# Patient Record
Sex: FEMALE | Race: BLACK OR AFRICAN AMERICAN | NOT HISPANIC OR LATINO
[De-identification: names, ages, dates, MRNs, and addresses within clinical notes are randomized per-mention and may not be internally consistent; named-entity substitution may affect disease eponyms.]

---

## 2021-03-11 PROBLEM — Z00.00 ENCOUNTER FOR PREVENTIVE HEALTH EXAMINATION: Status: ACTIVE | Noted: 2021-03-11

## 2021-03-29 NOTE — HISTORY OF PRESENT ILLNESS
[de-identified] : Ms. MOHAMUD OLIVEROS is a 75 year female who presents to office complaining of bilateral knee pain.\par \par All review of systems, family history, social history, surgical history, past medical history, medications, and allergies not previously stated as positive are negative. They were reviewed by me today with the patient and documented accordingly.

## 2021-03-31 ENCOUNTER — APPOINTMENT (OUTPATIENT)
Dept: ORTHOPEDIC SURGERY | Facility: CLINIC | Age: 76
End: 2021-03-31
Payer: MEDICARE

## 2021-03-31 ENCOUNTER — APPOINTMENT (OUTPATIENT)
Dept: ORTHOPEDIC SURGERY | Facility: CLINIC | Age: 76
End: 2021-03-31

## 2021-03-31 VITALS
HEART RATE: 69 BPM | HEIGHT: 65 IN | SYSTOLIC BLOOD PRESSURE: 202 MMHG | DIASTOLIC BLOOD PRESSURE: 80 MMHG | BODY MASS INDEX: 38.32 KG/M2 | WEIGHT: 230 LBS

## 2021-03-31 DIAGNOSIS — M25.651 STIFFNESS OF RIGHT HIP, NOT ELSEWHERE CLASSIFIED: ICD-10-CM

## 2021-03-31 PROCEDURE — 99072 ADDL SUPL MATRL&STAF TM PHE: CPT

## 2021-03-31 PROCEDURE — 73522 X-RAY EXAM HIPS BI 3-4 VIEWS: CPT

## 2021-03-31 PROCEDURE — 20610 DRAIN/INJ JOINT/BURSA W/O US: CPT | Mod: 50

## 2021-03-31 PROCEDURE — 73564 X-RAY EXAM KNEE 4 OR MORE: CPT | Mod: 50

## 2021-03-31 PROCEDURE — 99204 OFFICE O/P NEW MOD 45 MIN: CPT | Mod: 25

## 2021-03-31 RX ORDER — CELECOXIB 200 MG/1
200 CAPSULE ORAL DAILY
Qty: 30 | Refills: 1 | Status: ACTIVE | COMMUNITY
Start: 2021-03-31 | End: 1900-01-01

## 2021-03-31 NOTE — PROCEDURE
[de-identified] : \par \par Anatomic Location:  Right  Knee\par \par Diagnosis:  Arthritis\par \par Procedure:  Injection of 2cc  of Marcaine 0.25% plain and Celestone 1cc, 6mg\par \par Local Spray: Ethyl Chloride.\par \par \par Patient has consented for the procedure.\par \par Injection  through a lateral parapatella approach.\par \par Patient tolerated the procedure well.\par \par Patient instructed to call the office if any reaction, fever, chills, increased erythema or swelling.   [phone].\par \par Procedure Note:\par \par Anatomic Location:  Left Knee\par \par Diagnosis:  Arthritis\par \par Procedure:  Injection of 2cc  of Marcaine 0.25% plain and Celestone 1cc, 6mg\par \par Local Spray: Ethyl Chloride.\par \par \par Patient has consented for the procedure.\par \par Injection  through a lateral parapatella approach.\par \par Patient tolerated the procedure well.\par \par Patient instructed to call the office if any reaction, fever, chills, increased erythema or swelling.   [phone].

## 2021-03-31 NOTE — HISTORY OF PRESENT ILLNESS
[de-identified] : Ms. MOHAMUD OLIVEROS is a 75 year female who presents to office complaining of bilateral knee pain, R > L x many years.\par She has been treated by Dr. Cueva for this problem, diagnosed as osteoarthritis.\par She has received cortisone injections many times over the years, but they no longer seem to help her. Her last cortisone injection was over 3 months ago.\par She also saw a Dr. Morelos who gave her MADISON injections also in the past, but did not receive relief with this either.\par Patient has also tried and failed PT for this problem and takes Tylenol for her pain. She is hesitant to take NSAIDs due to "issues with her kidney".\par Pain is a constant dull/achy pain that becomes sharp, severe with prolonged weightbearing and ambulating.\par Pain is located in the medial and lateral aspects of the joint and does not radiate.\par All review of systems, family history, social history, surgical history, past medical history, medications, and allergies not previously stated as positive are negative. They were reviewed by me today with the patient and documented accordingly.

## 2021-03-31 NOTE — PHYSICAL EXAM
[de-identified] : Constitutional - the patient is of normal build and very overweight with a BMI of 38.  The patient remains oriented to person, time, and  place.  Mood is normal. Vital signs as recorded.  The patients gait is WNL but with pain in the medial aspect of both knees right more than left.. The patient has satisfactory  balance and can stand on toes and heels.\par \par The patient has no difficulty with respiration. Respiration at rest is a normal rate. The patient is not short of breath and has not become short of breath with short ambulation. There is no audible wheezing. No coughing.\par \par Skin is normal for the patient's age. There are no abnormal masses or lymph nodes which stand out in the lower extremities.\par \par Spine - deep tendon reflexes are symmetric. Motor and sensory are symmetric.\par \par \par UPPER EXTREMITIES \par \par Shoulders ROM  is symmetric  and the motion is satisfactory.  There is no significant shoulder pain or limitation in motion which would make using a cane or a walker difficult. Shoulder stability and  strength are satisfactory.\par \par There is normal motion in the wrists and elbows.\par \par Circulation appears satisfactory with pedal pulses present.  There is no major edema in the lower legs. No skin tenderness or increased temperature. No major varicosities.\par \par HIP EXAMINATION the abduction and abduction as well as rotation measurements were taken with the hip in flexion.\par \par Motion\par She has some mild right groin pain and slight right hip stiffness she has flexion right hip 125 left hip 135, abduction right hip 70 left hip 75, adduction right hip 25 left hip 25, external rotation right hip 65 left hip 75, internal rotation right hip 20 left hip 20.  She really feels a slight tightness in her right groin with motion yet she has not been having severe pain.\par  There is no crepitus in either hip. The alignment of the hips is normal.\par \par \par KNEE EXAMINATION\par \par Motion\par Right Knee has has pain with any ambulation and range of motion going from full extension to 115 degrees of flexion.  She has satisfactory medial lateral and anterior posterior stability there is no major effusion or Baker's cyst she has a varus alignment to her knee and pain over her medial joint line.  She has pain with weightbearing.  She has slight patellofemoral crepitus.  She has no pain over the medial joint line with motion and as well as some pain behind her patella.  \par \par            \par Left  Knee   has 0 to 120 degrees of motion with good medial lateral and anterior posterior stability.  She also has a varus alignment to this knee and pain over medial joint line.  She does have patellofemoral crepitus and some discomfort with compression of the patella but pain more with palpation of the medial joint line and a positive medial Steinmann test.  There is no Baker's cyst or no major effusion.  There this knee has pain diffusely and medially but she has more pain on the right than the left. \par \par  \par \par Ankle and foot examination\par Of the ankle has normal motion.  There is normal ankle stability.  The patient has no major abnormalities of the foot.\par \par \par \par  [de-identified] : An AP of the pelvis and a lateral of the right left hip show femoral heads are round and joint spaces are maintained.  As far as the x-rays no obvious arthritis is seen however she does complain of some groin pain and has some stiffness so will be observed in the future.\par \par 4 views were taken of both the right and left knees these show severe degenerative medial arthritis with loss of tibial plateau bone height and a slight varus each knee with degenerative changes at the periphery of the lateral compartment as well as peripheral patellofemoral degenerative changes impression.  Arthritis of all 3 compartments of the knee with most wearing degeneration of the medial compartment.  The right and left knees are approximately the same on x-ray.

## 2021-03-31 NOTE — DISCUSSION/SUMMARY
[de-identified] : At this time her hip will be watched conservatively as far as her knees she has severe degenerative arthritis and would do very well with total knee replacement in the future her right knee is worse and she would consider doing a first.  At this time she however would like to wait at least 3 months so she was given cortisone injections and given prescription for her Celebrex.\par \par A long discussion was had with the patient at total knee replacement the operative day as well as the postoperative plan as well as the cemented total knee replacement to be used.  The risk and benefits were discussed.  The natural history and treatment of degenerative arthritis was discussed with the patient at length today.  The spectrum of the treatment including nonoperative modalities to surgical intervention was elucidated.  Noninvasive and nonoperative treatment modalities include weight reduction, activity modification with low impact exercise, needed use of Tylenol or anti-inflammatory medications if tolerated, glucosamine and chondroitin supplement, and physical therapy.  In some cases the further treatment can include corticosteroid injections and the use of Visco supplementation with hyaluronic acid injections.  Definite surgical treatment can certainly include total joint arthroplasty also.  The risk and benefits of each treatment option was discussed and questions were answered.\par \par  A  long discussion was had with the patient as what the total joint replacement would entail.  A model was used as an educational tool to demonstrate the operation.    We did discuss implant choice and fixation, cemented or porous ingrowth, and stability concerns.  Shared decision making was made with the patient. The hospitalization and rehabilitation were discussed.  The uses of perioperative antibiotics and DVT prophylaxis were discussed.   The risks, benefits and alternatives to surgical intervention were discussed at length with the patient. Specific risks discussed included: infection, wound breakdown, numbness and damage to nerves, tendon, muscle, arteries or other blood vessels.  The possibility of recurrent pain, no improvement in pain and actual worsening of the pain were also mentioned in conversation with the patient. Medical complications related to the patient's general medical health including deep vein thrombosis, pulmonary embolus, heart attack, stroke, death and other complications from anesthesia were addressed. The patient was told that we will take steps to minimize these risks by using sterile technique, antibiotics and DVT prophylaxis when appropriate and following the patient postoperatively. The benefits of surgery were discussed with the patient including the potential to improve the current clinical condition throughout operative intervention. Alternatives to surgical intervention include continued conservative management which may yield less than optimal results this particular patient. Questions were answered to the satisfaction of the patient.\par \par In this individual the additional risk factors due to their medical conditions were discussed.\par \par She will return in 3 months and may want to consider scheduling total knee replacement at that time.\par \par Orthopedic decision making this patient would benefit greatly from right and left total knee replacements.\par \par The total time in this encounter with the patient, including review of the records reviewing x-rays and seeing the patient,  was over 45 minutes.

## 2021-06-01 ENCOUNTER — APPOINTMENT (OUTPATIENT)
Dept: ORTHOPEDIC SURGERY | Facility: CLINIC | Age: 76
End: 2021-06-01
Payer: MEDICARE

## 2021-06-01 VITALS
HEIGHT: 65 IN | SYSTOLIC BLOOD PRESSURE: 154 MMHG | WEIGHT: 230 LBS | BODY MASS INDEX: 38.32 KG/M2 | HEART RATE: 75 BPM | DIASTOLIC BLOOD PRESSURE: 84 MMHG

## 2021-06-01 DIAGNOSIS — M17.11 UNILATERAL PRIMARY OSTEOARTHRITIS, RIGHT KNEE: ICD-10-CM

## 2021-06-01 PROCEDURE — 99214 OFFICE O/P EST MOD 30 MIN: CPT

## 2021-06-01 NOTE — PHYSICAL EXAM
[de-identified] : \par KNEE EXAMINATION\par \par Motion\par Right Knee has has pain with any ambulation and range of motion going from 5- 115 degrees of flexion.  She has satisfactory medial lateral and anterior posterior stability there is no major effusion or Baker's cyst she has a varus alignment to her knee and pain over her medial joint line.  He has significant pain with any attempt to ambulate which has been longstanding.  She has marked patellofemoral crepitus today and pain with compression of her patella.  She has pain with palpation over medial joint line and some laterally.  \par            \par Left  Knee   has 0 to 120 degrees of motion with good medial lateral and anterior posterior stability.  She also has a varus alignment to this knee and pain over medial joint line.  There she does have patellofemoral crepitus and pain with compression of the patella but not as severe she has also pain with palpation over her medial joint line and does have a medial positive Steinmann test.  There is no effusion and no Baker's cyst.  \par \par

## 2021-06-01 NOTE — HISTORY OF PRESENT ILLNESS
[de-identified] : Ms. MOHAMUD OLIVEROS is a 75 year female who presents to office complaining of bilateral knee pain, R > L x many years.\par She has been treated by Dr. Cueva for this problem, diagnosed as osteoarthritis.\par She has received cortisone injections many times over the years, but they no longer seem to help her. Her last cortisone injection was 2 months ago and didn't help.\par She also saw a Dr. oMrelos who gave her MADISON injections also in the past, but did not receive relief with this either.\par Patient has also tried and failed PT for this problem and takes Tylenol for her pain. She is hesitant to take NSAIDs due to "issues with her kidney".\par Pain is a constant dull/achy pain that becomes sharp, severe with prolonged weightbearing and ambulating.\par Pain is located in the medial and lateral aspects of the joint and does not radiate.\par She is here today to discuss TKR surgery, R > L.

## 2021-06-01 NOTE — DISCUSSION/SUMMARY
[de-identified] : Patient has severe osteoarthritis of both of her knees.  Her right knee however is very severe and she is having great difficulty with her daily activities.  She has tried all conservative methods of treatment including different oral medications and injections however she now realizes that she would like to schedule a right total knee replacement.  A discussion of her knee and the surgical risk and benefits were had at her last visit however they are repeated and she was told again the risk and benefits.  The natural history and treatment of degenerative arthritis was discussed with the patient at length today.  The spectrum of the treatment including nonoperative modalities to surgical intervention was elucidated.  Noninvasive and nonoperative treatment modalities include weight reduction, activity modification with low impact exercise, needed use of Tylenol or anti-inflammatory medications if tolerated, glucosamine and chondroitin supplement, and physical therapy.  In some cases the further treatment can include corticosteroid injections and the use of Visco supplementation with hyaluronic acid injections.  Definite surgical treatment can certainly include total joint arthroplasty also.  The risk and benefits of each treatment option was discussed and questions were answered.\par \par  A  long discussion was had with the patient as what the total joint replacement would entail.  A model was used as an educational tool to demonstrate the operation.    We did discuss implant choice and fixation, cemented or porous ingrowth, and stability concerns.  Shared decision making was made with the patient. The hospitalization and rehabilitation were discussed.  The uses of perioperative antibiotics and DVT prophylaxis were discussed.   The risks, benefits and alternatives to surgical intervention were discussed at length with the patient. Specific risks discussed included: infection, wound breakdown, numbness and damage to nerves, tendon, muscle, arteries or other blood vessels.  The possibility of recurrent pain, no improvement in pain and actual worsening of the pain were also mentioned in conversation with the patient. Medical complications related to the patient's general medical health including deep vein thrombosis, pulmonary embolus, heart attack, stroke, death and other complications from anesthesia were addressed. The patient was told that we will take steps to minimize these risks by using sterile technique, antibiotics and DVT prophylaxis when appropriate and following the patient postoperatively. The benefits of surgery were discussed with the patient including the potential to improve the current clinical condition throughout operative intervention. Alternatives to surgical intervention include continued conservative management which may yield less than optimal results this particular patient. Questions were answered to the satisfaction of the patient.\par \par In this individual the additional risk factors due to their medical conditions were discussed.\par \par There was also a discussion today of the possible  use of the Koby robot and the total knee replacement.  Either a porous or cemented prosthesis.  She is in agreement with the plan.\par \par Orthopedic decision making #1 proceed with right total knee replacement.  Possibly do a left total knee replacement anytime after 3 months later.

## 2021-07-27 VITALS — HEIGHT: 65 IN | BODY MASS INDEX: 40.32 KG/M2 | WEIGHT: 242 LBS

## 2021-08-06 ENCOUNTER — APPOINTMENT (OUTPATIENT)
Dept: ORTHOPEDIC SURGERY | Facility: HOSPITAL | Age: 76
End: 2021-08-06

## 2021-08-27 ENCOUNTER — OUTPATIENT (OUTPATIENT)
Dept: OUTPATIENT SERVICES | Facility: HOSPITAL | Age: 76
LOS: 1 days | End: 2021-08-27
Payer: MEDICARE

## 2021-08-27 ENCOUNTER — APPOINTMENT (OUTPATIENT)
Dept: CT IMAGING | Facility: CLINIC | Age: 76
End: 2021-08-27
Payer: MEDICARE

## 2021-08-27 VITALS
TEMPERATURE: 98 F | SYSTOLIC BLOOD PRESSURE: 176 MMHG | HEART RATE: 66 BPM | WEIGHT: 235.01 LBS | RESPIRATION RATE: 18 BRPM | DIASTOLIC BLOOD PRESSURE: 75 MMHG | HEIGHT: 63 IN | OXYGEN SATURATION: 97 %

## 2021-08-27 DIAGNOSIS — M19.90 UNSPECIFIED OSTEOARTHRITIS, UNSPECIFIED SITE: ICD-10-CM

## 2021-08-27 DIAGNOSIS — Z01.818 ENCOUNTER FOR OTHER PREPROCEDURAL EXAMINATION: ICD-10-CM

## 2021-08-27 DIAGNOSIS — S68.119A COMPLETE TRAUMATIC METACARPOPHALANGEAL AMPUTATION OF UNSPECIFIED FINGER, INITIAL ENCOUNTER: Chronic | ICD-10-CM

## 2021-08-27 DIAGNOSIS — M17.11 UNILATERAL PRIMARY OSTEOARTHRITIS, RIGHT KNEE: ICD-10-CM

## 2021-08-27 DIAGNOSIS — Z98.890 OTHER SPECIFIED POSTPROCEDURAL STATES: Chronic | ICD-10-CM

## 2021-08-27 DIAGNOSIS — E11.9 TYPE 2 DIABETES MELLITUS WITHOUT COMPLICATIONS: ICD-10-CM

## 2021-08-27 LAB
A1C WITH ESTIMATED AVERAGE GLUCOSE RESULT: 6.3 % — HIGH (ref 4–5.6)
BLD GP AB SCN SERPL QL: NEGATIVE — SIGNIFICANT CHANGE UP
ESTIMATED AVERAGE GLUCOSE: 134 MG/DL — HIGH (ref 68–114)
MRSA PCR RESULT.: SIGNIFICANT CHANGE UP
RH IG SCN BLD-IMP: NEGATIVE — SIGNIFICANT CHANGE UP
S AUREUS DNA NOSE QL NAA+PROBE: SIGNIFICANT CHANGE UP

## 2021-08-27 PROCEDURE — G0463: CPT

## 2021-08-27 PROCEDURE — 83036 HEMOGLOBIN GLYCOSYLATED A1C: CPT

## 2021-08-27 PROCEDURE — 86901 BLOOD TYPING SEROLOGIC RH(D): CPT

## 2021-08-27 PROCEDURE — 86900 BLOOD TYPING SEROLOGIC ABO: CPT

## 2021-08-27 PROCEDURE — 87640 STAPH A DNA AMP PROBE: CPT

## 2021-08-27 PROCEDURE — 73700 CT LOWER EXTREMITY W/O DYE: CPT | Mod: 26,RT

## 2021-08-27 PROCEDURE — 86850 RBC ANTIBODY SCREEN: CPT

## 2021-08-27 PROCEDURE — 73700 CT LOWER EXTREMITY W/O DYE: CPT

## 2021-08-27 PROCEDURE — 87641 MR-STAPH DNA AMP PROBE: CPT

## 2021-08-27 RX ORDER — SODIUM CHLORIDE 9 MG/ML
3 INJECTION INTRAMUSCULAR; INTRAVENOUS; SUBCUTANEOUS EVERY 8 HOURS
Refills: 0 | Status: DISCONTINUED | OUTPATIENT
Start: 2021-09-10 | End: 2021-09-10

## 2021-08-27 RX ORDER — CEFAZOLIN SODIUM 1 G
2000 VIAL (EA) INJECTION ONCE
Refills: 0 | Status: DISCONTINUED | OUTPATIENT
Start: 2021-09-10 | End: 2021-09-13

## 2021-08-27 RX ORDER — TRAMADOL HYDROCHLORIDE 50 MG/1
50 TABLET ORAL ONCE
Refills: 0 | Status: DISCONTINUED | OUTPATIENT
Start: 2021-09-10 | End: 2021-09-10

## 2021-08-27 RX ORDER — LIDOCAINE HCL 20 MG/ML
0.2 VIAL (ML) INJECTION ONCE
Refills: 0 | Status: DISCONTINUED | OUTPATIENT
Start: 2021-09-10 | End: 2021-09-10

## 2021-08-27 RX ORDER — PANTOPRAZOLE SODIUM 20 MG/1
40 TABLET, DELAYED RELEASE ORAL ONCE
Refills: 0 | Status: COMPLETED | OUTPATIENT
Start: 2021-09-10 | End: 2021-09-10

## 2021-08-27 NOTE — H&P PST ADULT - HISTORY OF PRESENT ILLNESS
This is a 75 y/o female PMH diabetes, hypertension, RAMOS, non-compliant with CPAP, osteoarthritis, c/o right knee pain for many years, S/P PT and steroid injections without relief.  Presents today for right total knee replacement with KERI.

## 2021-08-27 NOTE — H&P PST ADULT - NSICDXPASTMEDICALHX_GEN_ALL_CORE_FT
PAST MEDICAL HISTORY:  Asthma     Diabetes     Hypertension     Osteoarthritis      PAST MEDICAL HISTORY:  Asthma     Diabetes     Hypertension     Lumbar herniated disc     Osteoarthritis

## 2021-09-01 ENCOUNTER — NON-APPOINTMENT (OUTPATIENT)
Age: 76
End: 2021-09-01

## 2021-09-07 PROBLEM — J45.909 UNSPECIFIED ASTHMA, UNCOMPLICATED: Chronic | Status: ACTIVE | Noted: 2021-08-27

## 2021-09-07 PROBLEM — M19.90 UNSPECIFIED OSTEOARTHRITIS, UNSPECIFIED SITE: Chronic | Status: ACTIVE | Noted: 2021-08-27

## 2021-09-07 PROBLEM — I10 ESSENTIAL (PRIMARY) HYPERTENSION: Chronic | Status: ACTIVE | Noted: 2021-08-27

## 2021-09-07 PROBLEM — M51.26 OTHER INTERVERTEBRAL DISC DISPLACEMENT, LUMBAR REGION: Chronic | Status: ACTIVE | Noted: 2021-08-27

## 2021-09-08 ENCOUNTER — OUTPATIENT (OUTPATIENT)
Dept: OUTPATIENT SERVICES | Facility: HOSPITAL | Age: 76
LOS: 1 days | End: 2021-09-08
Payer: MEDICARE

## 2021-09-08 DIAGNOSIS — Z98.890 OTHER SPECIFIED POSTPROCEDURAL STATES: Chronic | ICD-10-CM

## 2021-09-08 DIAGNOSIS — S68.119A COMPLETE TRAUMATIC METACARPOPHALANGEAL AMPUTATION OF UNSPECIFIED FINGER, INITIAL ENCOUNTER: Chronic | ICD-10-CM

## 2021-09-08 DIAGNOSIS — Z11.52 ENCOUNTER FOR SCREENING FOR COVID-19: ICD-10-CM

## 2021-09-08 LAB — SARS-COV-2 RNA SPEC QL NAA+PROBE: SIGNIFICANT CHANGE UP

## 2021-09-08 PROCEDURE — U0005: CPT

## 2021-09-08 PROCEDURE — C9803: CPT

## 2021-09-08 PROCEDURE — U0003: CPT

## 2021-09-09 ENCOUNTER — TRANSCRIPTION ENCOUNTER (OUTPATIENT)
Age: 76
End: 2021-09-09

## 2021-09-10 ENCOUNTER — APPOINTMENT (OUTPATIENT)
Dept: ORTHOPEDIC SURGERY | Facility: HOSPITAL | Age: 76
End: 2021-09-10

## 2021-09-10 ENCOUNTER — INPATIENT (INPATIENT)
Facility: HOSPITAL | Age: 76
LOS: 2 days | Discharge: HOME HEALTH | DRG: 470 | End: 2021-09-13
Attending: ORTHOPAEDIC SURGERY | Admitting: ORTHOPAEDIC SURGERY
Payer: MEDICARE

## 2021-09-10 VITALS
SYSTOLIC BLOOD PRESSURE: 166 MMHG | RESPIRATION RATE: 18 BRPM | TEMPERATURE: 99 F | OXYGEN SATURATION: 97 % | DIASTOLIC BLOOD PRESSURE: 76 MMHG | HEART RATE: 58 BPM | WEIGHT: 235.01 LBS | HEIGHT: 63 IN

## 2021-09-10 DIAGNOSIS — Z98.890 OTHER SPECIFIED POSTPROCEDURAL STATES: Chronic | ICD-10-CM

## 2021-09-10 DIAGNOSIS — M17.11 UNILATERAL PRIMARY OSTEOARTHRITIS, RIGHT KNEE: ICD-10-CM

## 2021-09-10 DIAGNOSIS — S68.119A COMPLETE TRAUMATIC METACARPOPHALANGEAL AMPUTATION OF UNSPECIFIED FINGER, INITIAL ENCOUNTER: Chronic | ICD-10-CM

## 2021-09-10 LAB
GLUCOSE BLDC GLUCOMTR-MCNC: 119 MG/DL — HIGH (ref 70–99)
GLUCOSE BLDC GLUCOMTR-MCNC: 120 MG/DL — HIGH (ref 70–99)
GLUCOSE BLDC GLUCOMTR-MCNC: 182 MG/DL — HIGH (ref 70–99)
RH IG SCN BLD-IMP: NEGATIVE — SIGNIFICANT CHANGE UP

## 2021-09-10 PROCEDURE — 27447 TOTAL KNEE ARTHROPLASTY: CPT | Mod: RT

## 2021-09-10 PROCEDURE — 73560 X-RAY EXAM OF KNEE 1 OR 2: CPT | Mod: 26,RT

## 2021-09-10 RX ORDER — SODIUM CHLORIDE 9 MG/ML
500 INJECTION, SOLUTION INTRAVENOUS ONCE
Refills: 0 | Status: COMPLETED | OUTPATIENT
Start: 2021-09-10 | End: 2021-09-11

## 2021-09-10 RX ORDER — ACETAMINOPHEN 500 MG
1000 TABLET ORAL ONCE
Refills: 0 | Status: DISCONTINUED | OUTPATIENT
Start: 2021-09-10 | End: 2021-09-13

## 2021-09-10 RX ORDER — DEXAMETHASONE 0.5 MG/5ML
8 ELIXIR ORAL ONCE
Refills: 0 | Status: COMPLETED | OUTPATIENT
Start: 2021-09-11 | End: 2021-09-11

## 2021-09-10 RX ORDER — SODIUM CHLORIDE 9 MG/ML
1000 INJECTION, SOLUTION INTRAVENOUS
Refills: 0 | Status: DISCONTINUED | OUTPATIENT
Start: 2021-09-10 | End: 2021-09-13

## 2021-09-10 RX ORDER — GLUCAGON INJECTION, SOLUTION 0.5 MG/.1ML
1 INJECTION, SOLUTION SUBCUTANEOUS ONCE
Refills: 0 | Status: DISCONTINUED | OUTPATIENT
Start: 2021-09-10 | End: 2021-09-13

## 2021-09-10 RX ORDER — ONDANSETRON 8 MG/1
4 TABLET, FILM COATED ORAL ONCE
Refills: 0 | Status: DISCONTINUED | OUTPATIENT
Start: 2021-09-10 | End: 2021-09-10

## 2021-09-10 RX ORDER — INSULIN LISPRO 100/ML
VIAL (ML) SUBCUTANEOUS
Refills: 0 | Status: DISCONTINUED | OUTPATIENT
Start: 2021-09-10 | End: 2021-09-13

## 2021-09-10 RX ORDER — TRAMADOL HYDROCHLORIDE 50 MG/1
50 TABLET ORAL EVERY 6 HOURS
Refills: 0 | Status: DISCONTINUED | OUTPATIENT
Start: 2021-09-10 | End: 2021-09-13

## 2021-09-10 RX ORDER — SODIUM CHLORIDE 9 MG/ML
500 INJECTION, SOLUTION INTRAVENOUS ONCE
Refills: 0 | Status: COMPLETED | OUTPATIENT
Start: 2021-09-10 | End: 2021-09-10

## 2021-09-10 RX ORDER — DEXTROSE 50 % IN WATER 50 %
25 SYRINGE (ML) INTRAVENOUS ONCE
Refills: 0 | Status: DISCONTINUED | OUTPATIENT
Start: 2021-09-10 | End: 2021-09-13

## 2021-09-10 RX ORDER — GABAPENTIN 400 MG/1
300 CAPSULE ORAL
Refills: 0 | Status: DISCONTINUED | OUTPATIENT
Start: 2021-09-10 | End: 2021-09-13

## 2021-09-10 RX ORDER — CEFAZOLIN SODIUM 1 G
2000 VIAL (EA) INJECTION EVERY 8 HOURS
Refills: 0 | Status: COMPLETED | OUTPATIENT
Start: 2021-09-10 | End: 2021-09-11

## 2021-09-10 RX ORDER — ONDANSETRON 8 MG/1
4 TABLET, FILM COATED ORAL EVERY 6 HOURS
Refills: 0 | Status: DISCONTINUED | OUTPATIENT
Start: 2021-09-10 | End: 2021-09-13

## 2021-09-10 RX ORDER — DEXTROSE 50 % IN WATER 50 %
15 SYRINGE (ML) INTRAVENOUS ONCE
Refills: 0 | Status: DISCONTINUED | OUTPATIENT
Start: 2021-09-10 | End: 2021-09-13

## 2021-09-10 RX ORDER — MAGNESIUM HYDROXIDE 400 MG/1
30 TABLET, CHEWABLE ORAL DAILY
Refills: 0 | Status: DISCONTINUED | OUTPATIENT
Start: 2021-09-10 | End: 2021-09-13

## 2021-09-10 RX ORDER — KETOROLAC TROMETHAMINE 30 MG/ML
15 SYRINGE (ML) INJECTION EVERY 6 HOURS
Refills: 0 | Status: DISCONTINUED | OUTPATIENT
Start: 2021-09-10 | End: 2021-09-11

## 2021-09-10 RX ORDER — FENTANYL CITRATE 50 UG/ML
25 INJECTION INTRAVENOUS
Refills: 0 | Status: DISCONTINUED | OUTPATIENT
Start: 2021-09-10 | End: 2021-09-10

## 2021-09-10 RX ORDER — ASPIRIN/CALCIUM CARB/MAGNESIUM 324 MG
325 TABLET ORAL
Refills: 0 | Status: DISCONTINUED | OUTPATIENT
Start: 2021-09-10 | End: 2021-09-13

## 2021-09-10 RX ORDER — INSULIN LISPRO 100/ML
VIAL (ML) SUBCUTANEOUS AT BEDTIME
Refills: 0 | Status: DISCONTINUED | OUTPATIENT
Start: 2021-09-10 | End: 2021-09-13

## 2021-09-10 RX ORDER — ACETAMINOPHEN 500 MG
975 TABLET ORAL EVERY 8 HOURS
Refills: 0 | Status: DISCONTINUED | OUTPATIENT
Start: 2021-09-10 | End: 2021-09-13

## 2021-09-10 RX ORDER — ATORVASTATIN CALCIUM 80 MG/1
20 TABLET, FILM COATED ORAL AT BEDTIME
Refills: 0 | Status: DISCONTINUED | OUTPATIENT
Start: 2021-09-10 | End: 2021-09-13

## 2021-09-10 RX ORDER — ALBUTEROL 90 UG/1
2 AEROSOL, METERED ORAL EVERY 6 HOURS
Refills: 0 | Status: DISCONTINUED | OUTPATIENT
Start: 2021-09-10 | End: 2021-09-13

## 2021-09-10 RX ORDER — METOPROLOL TARTRATE 50 MG
200 TABLET ORAL DAILY
Refills: 0 | Status: DISCONTINUED | OUTPATIENT
Start: 2021-09-10 | End: 2021-09-13

## 2021-09-10 RX ORDER — CHLORHEXIDINE GLUCONATE 213 G/1000ML
1 SOLUTION TOPICAL ONCE
Refills: 0 | Status: COMPLETED | OUTPATIENT
Start: 2021-09-10 | End: 2021-09-10

## 2021-09-10 RX ORDER — METHOCARBAMOL 500 MG/1
750 TABLET, FILM COATED ORAL
Refills: 0 | Status: DISCONTINUED | OUTPATIENT
Start: 2021-09-10 | End: 2021-09-13

## 2021-09-10 RX ORDER — PANTOPRAZOLE SODIUM 20 MG/1
40 TABLET, DELAYED RELEASE ORAL
Refills: 0 | Status: DISCONTINUED | OUTPATIENT
Start: 2021-09-10 | End: 2021-09-13

## 2021-09-10 RX ORDER — OXYCODONE HYDROCHLORIDE 5 MG/1
10 TABLET ORAL
Refills: 0 | Status: DISCONTINUED | OUTPATIENT
Start: 2021-09-10 | End: 2021-09-13

## 2021-09-10 RX ORDER — FUROSEMIDE 40 MG
40 TABLET ORAL DAILY
Refills: 0 | Status: DISCONTINUED | OUTPATIENT
Start: 2021-09-10 | End: 2021-09-13

## 2021-09-10 RX ORDER — HYDROMORPHONE HYDROCHLORIDE 2 MG/ML
0.5 INJECTION INTRAMUSCULAR; INTRAVENOUS; SUBCUTANEOUS ONCE
Refills: 0 | Status: DISCONTINUED | OUTPATIENT
Start: 2021-09-10 | End: 2021-09-13

## 2021-09-10 RX ORDER — DEXTROSE 50 % IN WATER 50 %
12.5 SYRINGE (ML) INTRAVENOUS ONCE
Refills: 0 | Status: DISCONTINUED | OUTPATIENT
Start: 2021-09-10 | End: 2021-09-13

## 2021-09-10 RX ORDER — OXYCODONE HYDROCHLORIDE 5 MG/1
5 TABLET ORAL
Refills: 0 | Status: DISCONTINUED | OUTPATIENT
Start: 2021-09-10 | End: 2021-09-13

## 2021-09-10 RX ORDER — BUDESONIDE AND FORMOTEROL FUMARATE DIHYDRATE 160; 4.5 UG/1; UG/1
2 AEROSOL RESPIRATORY (INHALATION) DAILY
Refills: 0 | Status: DISCONTINUED | OUTPATIENT
Start: 2021-09-10 | End: 2021-09-13

## 2021-09-10 RX ADMIN — Medication 975 MILLIGRAM(S): at 21:28

## 2021-09-10 RX ADMIN — SODIUM CHLORIDE 100 MILLILITER(S): 9 INJECTION, SOLUTION INTRAVENOUS at 18:26

## 2021-09-10 RX ADMIN — METHOCARBAMOL 750 MILLIGRAM(S): 500 TABLET, FILM COATED ORAL at 20:03

## 2021-09-10 RX ADMIN — Medication 15 MILLIGRAM(S): at 21:30

## 2021-09-10 RX ADMIN — SODIUM CHLORIDE 500 MILLILITER(S): 9 INJECTION, SOLUTION INTRAVENOUS at 18:26

## 2021-09-10 RX ADMIN — TRAMADOL HYDROCHLORIDE 50 MILLIGRAM(S): 50 TABLET ORAL at 13:47

## 2021-09-10 RX ADMIN — GABAPENTIN 300 MILLIGRAM(S): 400 CAPSULE ORAL at 18:25

## 2021-09-10 RX ADMIN — SODIUM CHLORIDE 500 MILLILITER(S): 9 INJECTION, SOLUTION INTRAVENOUS at 21:23

## 2021-09-10 RX ADMIN — Medication 100 MILLIGRAM(S): at 21:24

## 2021-09-10 RX ADMIN — Medication 325 MILLIGRAM(S): at 18:25

## 2021-09-10 RX ADMIN — Medication 150 MILLIGRAM(S): at 13:47

## 2021-09-10 RX ADMIN — OXYCODONE HYDROCHLORIDE 5 MILLIGRAM(S): 5 TABLET ORAL at 22:05

## 2021-09-10 RX ADMIN — OXYCODONE HYDROCHLORIDE 5 MILLIGRAM(S): 5 TABLET ORAL at 21:35

## 2021-09-10 RX ADMIN — CHLORHEXIDINE GLUCONATE 1 APPLICATION(S): 213 SOLUTION TOPICAL at 13:25

## 2021-09-10 RX ADMIN — SODIUM CHLORIDE 100 MILLILITER(S): 9 INJECTION, SOLUTION INTRAVENOUS at 23:02

## 2021-09-10 RX ADMIN — PANTOPRAZOLE SODIUM 40 MILLIGRAM(S): 20 TABLET, DELAYED RELEASE ORAL at 13:47

## 2021-09-10 NOTE — CHART NOTE - NSCHARTNOTEFT_GEN_A_CORE
Patient seen in PACU resting without complaints.  No Chest Pain, SOB, N/V.    T(C): 36.2 (09-10-21 @ 17:10), Max: 37.2 (09-10-21 @ 11:55)  HR: 53 (09-10-21 @ 18:30) (49 - 59)  BP: 125/61 (09-10-21 @ 18:30) (93/55 - 166/76)  RR: 15 (09-10-21 @ 18:30) (15 - 18)  SpO2: 98% (09-10-21 @ 18:30) (95% - 98%)  Wt(kg): --    Exam:  Alert and oriented, no acute distress  Laterality: RLE knee aquacel in place, C/D/I  Calves soft, non-tender bilaterally  +PF/DF/EHL/FHL  SILT  + DP pulse    Xray: in chart             A/P: 76yFemale S/p  R Total Knee Arthroplasty. VSS. NAD  -PT/OT-WBAT RLE, OOB when tolerated  -IS encouraged  -DVT PPx with  mg BID  -Followup AM labs  -Pain Control  -PACU to floor    Umu Dubois PA-C  Team Pager #[phone]

## 2021-09-10 NOTE — PHYSICAL THERAPY INITIAL EVALUATION ADULT - PERTINENT HX OF CURRENT PROBLEM, REHAB EVAL
75 y/o F with h/o diabetes, HTN, RAMOS, non-compliant with CPAP, OA, with c/o R knee pain for many years. pt now presents s/p R TKA with KERI Robot.

## 2021-09-10 NOTE — PATIENT PROFILE ADULT - NSPROGENPREVTRANSF_GEN_A_NUR
no history of blood product transfusion Peng Advancement Flap Text: The defect edges were debeveled with a #15 scalpel blade.  Given the location of the defect, shape of the defect and the proximity to free margins a Peng advancement flap was deemed most appropriate.  Using a sterile surgical marker, an appropriate advancement flap was drawn incorporating the defect and placing the expected incisions within the relaxed skin tension lines where possible. The area thus outlined was incised deep to adipose tissue with a #15 scalpel blade.  The skin margins were undermined to an appropriate distance in all directions utilizing iris scissors.

## 2021-09-10 NOTE — PRE-ANESTHESIA EVALUATION ADULT - NSANTHPMHFT_GEN_ALL_CORE
75 y/o female PMH diabetes, hypertension, RAMOS, non-compliant with CPAP, osteoarthritis, c/o right knee pain for many years, S/P PT and steroid injections without relief.  Presents today for right total knee replacement with KERI. 77 y/o female PMH diabetes, hypertension, RAMOS, non-compliant with CPAP, osteoarthritis, c/o right knee pain for many years, S/P PT and steroid injections without relief.  Also has LVEF 40% and mild pulmonary HTN on outpatient TTE

## 2021-09-10 NOTE — PHYSICAL THERAPY INITIAL EVALUATION ADULT - ADDITIONAL COMMENTS
pt lives in elevated apt with dtr. Prior to admission, pt was I with all functional mobility and ADLs without AD. Pt owns a cane, but admits to not using it.

## 2021-09-11 LAB
ANION GAP SERPL CALC-SCNC: 15 MMOL/L — SIGNIFICANT CHANGE UP (ref 5–17)
BUN SERPL-MCNC: 25 MG/DL — HIGH (ref 7–23)
CALCIUM SERPL-MCNC: 9.9 MG/DL — SIGNIFICANT CHANGE UP (ref 8.4–10.5)
CHLORIDE SERPL-SCNC: 101 MMOL/L — SIGNIFICANT CHANGE UP (ref 96–108)
CO2 SERPL-SCNC: 21 MMOL/L — LOW (ref 22–31)
COVID-19 SPIKE DOMAIN AB INTERP: POSITIVE
COVID-19 SPIKE DOMAIN ANTIBODY RESULT: >250 U/ML — HIGH
CREAT SERPL-MCNC: 1.16 MG/DL — SIGNIFICANT CHANGE UP (ref 0.5–1.3)
GLUCOSE BLDC GLUCOMTR-MCNC: 144 MG/DL — HIGH (ref 70–99)
GLUCOSE BLDC GLUCOMTR-MCNC: 151 MG/DL — HIGH (ref 70–99)
GLUCOSE BLDC GLUCOMTR-MCNC: 191 MG/DL — HIGH (ref 70–99)
GLUCOSE BLDC GLUCOMTR-MCNC: 200 MG/DL — HIGH (ref 70–99)
GLUCOSE SERPL-MCNC: 157 MG/DL — HIGH (ref 70–99)
HCT VFR BLD CALC: 34.3 % — LOW (ref 34.5–45)
HGB BLD-MCNC: 10.8 G/DL — LOW (ref 11.5–15.5)
MCHC RBC-ENTMCNC: 27.9 PG — SIGNIFICANT CHANGE UP (ref 27–34)
MCHC RBC-ENTMCNC: 31.5 GM/DL — LOW (ref 32–36)
MCV RBC AUTO: 88.6 FL — SIGNIFICANT CHANGE UP (ref 80–100)
NRBC # BLD: 0 /100 WBCS — SIGNIFICANT CHANGE UP (ref 0–0)
PLATELET # BLD AUTO: 225 K/UL — SIGNIFICANT CHANGE UP (ref 150–400)
POTASSIUM SERPL-MCNC: 4.3 MMOL/L — SIGNIFICANT CHANGE UP (ref 3.5–5.3)
POTASSIUM SERPL-SCNC: 4.3 MMOL/L — SIGNIFICANT CHANGE UP (ref 3.5–5.3)
RBC # BLD: 3.87 M/UL — SIGNIFICANT CHANGE UP (ref 3.8–5.2)
RBC # FLD: 13.2 % — SIGNIFICANT CHANGE UP (ref 10.3–14.5)
SARS-COV-2 IGG+IGM SERPL QL IA: >250 U/ML — HIGH
SARS-COV-2 IGG+IGM SERPL QL IA: POSITIVE
SODIUM SERPL-SCNC: 137 MMOL/L — SIGNIFICANT CHANGE UP (ref 135–145)
WBC # BLD: 12.14 K/UL — HIGH (ref 3.8–10.5)
WBC # FLD AUTO: 12.14 K/UL — HIGH (ref 3.8–10.5)

## 2021-09-11 RX ADMIN — METHOCARBAMOL 750 MILLIGRAM(S): 500 TABLET, FILM COATED ORAL at 10:00

## 2021-09-11 RX ADMIN — OXYCODONE HYDROCHLORIDE 10 MILLIGRAM(S): 5 TABLET ORAL at 09:23

## 2021-09-11 RX ADMIN — GABAPENTIN 300 MILLIGRAM(S): 400 CAPSULE ORAL at 05:24

## 2021-09-11 RX ADMIN — Medication 975 MILLIGRAM(S): at 05:26

## 2021-09-11 RX ADMIN — Medication 325 MILLIGRAM(S): at 05:24

## 2021-09-11 RX ADMIN — GABAPENTIN 300 MILLIGRAM(S): 400 CAPSULE ORAL at 18:09

## 2021-09-11 RX ADMIN — METHOCARBAMOL 750 MILLIGRAM(S): 500 TABLET, FILM COATED ORAL at 22:03

## 2021-09-11 RX ADMIN — Medication 975 MILLIGRAM(S): at 22:33

## 2021-09-11 RX ADMIN — PANTOPRAZOLE SODIUM 40 MILLIGRAM(S): 20 TABLET, DELAYED RELEASE ORAL at 05:24

## 2021-09-11 RX ADMIN — Medication 100 MILLIGRAM(S): at 05:24

## 2021-09-11 RX ADMIN — Medication 200 MILLIGRAM(S): at 06:47

## 2021-09-11 RX ADMIN — Medication 1 TABLET(S): at 12:33

## 2021-09-11 RX ADMIN — Medication 15 MILLIGRAM(S): at 12:47

## 2021-09-11 RX ADMIN — SODIUM CHLORIDE 500 MILLILITER(S): 9 INJECTION, SOLUTION INTRAVENOUS at 05:23

## 2021-09-11 RX ADMIN — OXYCODONE HYDROCHLORIDE 10 MILLIGRAM(S): 5 TABLET ORAL at 08:53

## 2021-09-11 RX ADMIN — Medication 15 MILLIGRAM(S): at 12:32

## 2021-09-11 RX ADMIN — ATORVASTATIN CALCIUM 20 MILLIGRAM(S): 80 TABLET, FILM COATED ORAL at 22:03

## 2021-09-11 RX ADMIN — Medication 325 MILLIGRAM(S): at 18:10

## 2021-09-11 RX ADMIN — Medication 40 MILLIGRAM(S): at 05:24

## 2021-09-11 RX ADMIN — Medication 15 MILLIGRAM(S): at 18:09

## 2021-09-11 RX ADMIN — Medication 15 MILLIGRAM(S): at 05:24

## 2021-09-11 RX ADMIN — Medication 2: at 18:09

## 2021-09-11 RX ADMIN — Medication 2: at 12:32

## 2021-09-11 RX ADMIN — Medication 975 MILLIGRAM(S): at 22:03

## 2021-09-11 RX ADMIN — BUDESONIDE AND FORMOTEROL FUMARATE DIHYDRATE 2 PUFF(S): 160; 4.5 AEROSOL RESPIRATORY (INHALATION) at 12:33

## 2021-09-11 RX ADMIN — Medication 8 MILLIGRAM(S): at 06:46

## 2021-09-11 NOTE — PROVIDER CONTACT NOTE (OTHER) - ACTION/TREATMENT ORDERED:
MD made aware. As per MD Corley, bladder scan Pt in 2 hours and notify MD at that time. Will continue to monitor Pt.
MD made aware. As per MD Corley, bladder scan Pt in 2 hours and notify MD at that time. Will continue to monitor Pt.

## 2021-09-11 NOTE — PROGRESS NOTE ADULT - NSPROGADDITIONALINFOA_GEN_ALL_CORE
Patient is a 76y old  Female    right knee replacement        Patient comfortable:    No complaints    I agree with Physician's Assistant current note.      Alert, NAD    Knee: Moderate drainage in dressing. Will =change today.     Plan for physical therapy to get out of bed, ambulate full weight bearing as tolerated. work on knee ROM        Plan for Disposition:  This patient lives alone. She will work on a plan with the [facility] planning office.     Quentin Dutton MD  [facility]  [phone]

## 2021-09-11 NOTE — PROVIDER CONTACT NOTE (OTHER) - ASSESSMENT
Pt remains A&Ox4, VSS. Pt DTV @03:00. Pt denies discomfort.  Denies pain upon palpation, bladder distended. Pt reports the urge to urinate.
Pt remains A&Ox4, VSS. Pt DTV @01:00. Pt denies discomfort.  Denies urge to urinate at this time.

## 2021-09-11 NOTE — OCCUPATIONAL THERAPY INITIAL EVALUATION ADULT - PERSONAL SAFETY AND JUDGMENT, REHAB EVAL
mildly impulsive; attempting to stand without assistance & without ability to fully actively extend knee/impaired

## 2021-09-11 NOTE — OCCUPATIONAL THERAPY INITIAL EVALUATION ADULT - LIVES WITH, PROFILE
Pt lives in an apartment with her daughter who works full time, +no steps to enter, +elevator to apartment, +shower tub with chair.

## 2021-09-11 NOTE — OCCUPATIONAL THERAPY INITIAL EVALUATION ADULT - PERTINENT HX OF CURRENT PROBLEM, REHAB EVAL
75 y/o female PMH diabetes, hypertension, RAMOS, non-compliant with CPAP, osteoarthritis, c/o right knee pain for many years, S/P PT and steroid injections without relief. Now s/p R TKA 9/10/21.

## 2021-09-12 LAB
GLUCOSE BLDC GLUCOMTR-MCNC: 117 MG/DL — HIGH (ref 70–99)
GLUCOSE BLDC GLUCOMTR-MCNC: 127 MG/DL — HIGH (ref 70–99)
GLUCOSE BLDC GLUCOMTR-MCNC: 137 MG/DL — HIGH (ref 70–99)
GLUCOSE BLDC GLUCOMTR-MCNC: 138 MG/DL — HIGH (ref 70–99)

## 2021-09-12 RX ADMIN — Medication 1 TABLET(S): at 09:22

## 2021-09-12 RX ADMIN — Medication 975 MILLIGRAM(S): at 22:01

## 2021-09-12 RX ADMIN — OXYCODONE HYDROCHLORIDE 5 MILLIGRAM(S): 5 TABLET ORAL at 21:31

## 2021-09-12 RX ADMIN — Medication 975 MILLIGRAM(S): at 05:32

## 2021-09-12 RX ADMIN — Medication 325 MILLIGRAM(S): at 05:31

## 2021-09-12 RX ADMIN — Medication 975 MILLIGRAM(S): at 14:00

## 2021-09-12 RX ADMIN — METHOCARBAMOL 750 MILLIGRAM(S): 500 TABLET, FILM COATED ORAL at 21:30

## 2021-09-12 RX ADMIN — GABAPENTIN 300 MILLIGRAM(S): 400 CAPSULE ORAL at 05:31

## 2021-09-12 RX ADMIN — Medication 975 MILLIGRAM(S): at 13:29

## 2021-09-12 RX ADMIN — BUDESONIDE AND FORMOTEROL FUMARATE DIHYDRATE 2 PUFF(S): 160; 4.5 AEROSOL RESPIRATORY (INHALATION) at 09:21

## 2021-09-12 RX ADMIN — Medication 40 MILLIGRAM(S): at 05:32

## 2021-09-12 RX ADMIN — PANTOPRAZOLE SODIUM 40 MILLIGRAM(S): 20 TABLET, DELAYED RELEASE ORAL at 05:31

## 2021-09-12 RX ADMIN — OXYCODONE HYDROCHLORIDE 5 MILLIGRAM(S): 5 TABLET ORAL at 22:01

## 2021-09-12 RX ADMIN — GABAPENTIN 300 MILLIGRAM(S): 400 CAPSULE ORAL at 17:36

## 2021-09-12 RX ADMIN — METHOCARBAMOL 750 MILLIGRAM(S): 500 TABLET, FILM COATED ORAL at 09:22

## 2021-09-12 RX ADMIN — OXYCODONE HYDROCHLORIDE 10 MILLIGRAM(S): 5 TABLET ORAL at 12:15

## 2021-09-12 RX ADMIN — Medication 975 MILLIGRAM(S): at 06:02

## 2021-09-12 RX ADMIN — ATORVASTATIN CALCIUM 20 MILLIGRAM(S): 80 TABLET, FILM COATED ORAL at 21:30

## 2021-09-12 RX ADMIN — OXYCODONE HYDROCHLORIDE 10 MILLIGRAM(S): 5 TABLET ORAL at 16:16

## 2021-09-12 RX ADMIN — OXYCODONE HYDROCHLORIDE 10 MILLIGRAM(S): 5 TABLET ORAL at 11:45

## 2021-09-12 RX ADMIN — OXYCODONE HYDROCHLORIDE 10 MILLIGRAM(S): 5 TABLET ORAL at 15:46

## 2021-09-12 RX ADMIN — Medication 325 MILLIGRAM(S): at 17:36

## 2021-09-12 RX ADMIN — Medication 975 MILLIGRAM(S): at 21:31

## 2021-09-13 ENCOUNTER — TRANSCRIPTION ENCOUNTER (OUTPATIENT)
Age: 76
End: 2021-09-13

## 2021-09-13 VITALS — HEART RATE: 80 BPM | OXYGEN SATURATION: 96 %

## 2021-09-13 LAB
GLUCOSE BLDC GLUCOMTR-MCNC: 101 MG/DL — HIGH (ref 70–99)
GLUCOSE BLDC GLUCOMTR-MCNC: 148 MG/DL — HIGH (ref 70–99)

## 2021-09-13 PROCEDURE — 73560 X-RAY EXAM OF KNEE 1 OR 2: CPT

## 2021-09-13 PROCEDURE — 94660 CPAP INITIATION&MGMT: CPT

## 2021-09-13 PROCEDURE — 94640 AIRWAY INHALATION TREATMENT: CPT

## 2021-09-13 PROCEDURE — 85027 COMPLETE CBC AUTOMATED: CPT

## 2021-09-13 PROCEDURE — 97161 PT EVAL LOW COMPLEX 20 MIN: CPT

## 2021-09-13 PROCEDURE — S2900: CPT

## 2021-09-13 PROCEDURE — 82962 GLUCOSE BLOOD TEST: CPT

## 2021-09-13 PROCEDURE — 80048 BASIC METABOLIC PNL TOTAL CA: CPT

## 2021-09-13 PROCEDURE — 97116 GAIT TRAINING THERAPY: CPT

## 2021-09-13 PROCEDURE — C1776: CPT

## 2021-09-13 PROCEDURE — 97530 THERAPEUTIC ACTIVITIES: CPT

## 2021-09-13 PROCEDURE — 86769 SARS-COV-2 COVID-19 ANTIBODY: CPT

## 2021-09-13 PROCEDURE — 97535 SELF CARE MNGMENT TRAINING: CPT

## 2021-09-13 PROCEDURE — C1713: CPT

## 2021-09-13 PROCEDURE — 97165 OT EVAL LOW COMPLEX 30 MIN: CPT

## 2021-09-13 PROCEDURE — 97112 NEUROMUSCULAR REEDUCATION: CPT

## 2021-09-13 RX ORDER — TRAMADOL HYDROCHLORIDE 50 MG/1
1 TABLET ORAL
Qty: 28 | Refills: 0
Start: 2021-09-13 | End: 2021-09-19

## 2021-09-13 RX ORDER — OXYCODONE HYDROCHLORIDE 5 MG/1
1 TABLET ORAL
Qty: 42 | Refills: 0
Start: 2021-09-13 | End: 2021-09-19

## 2021-09-13 RX ORDER — OMEPRAZOLE 10 MG/1
1 CAPSULE, DELAYED RELEASE ORAL
Qty: 0 | Refills: 0 | DISCHARGE

## 2021-09-13 RX ORDER — ASPIRIN/CALCIUM CARB/MAGNESIUM 324 MG
1 TABLET ORAL
Qty: 84 | Refills: 0
Start: 2021-09-13 | End: 2021-10-24

## 2021-09-13 RX ORDER — ACETAMINOPHEN 500 MG
3 TABLET ORAL
Qty: 0 | Refills: 0 | DISCHARGE
Start: 2021-09-13

## 2021-09-13 RX ORDER — ASPIRIN/CALCIUM CARB/MAGNESIUM 324 MG
1 TABLET ORAL
Qty: 0 | Refills: 0 | DISCHARGE
Start: 2021-09-13

## 2021-09-13 RX ORDER — OMEPRAZOLE 10 MG/1
1 CAPSULE, DELAYED RELEASE ORAL
Qty: 42 | Refills: 0
Start: 2021-09-13 | End: 2021-10-24

## 2021-09-13 RX ORDER — POLYETHYLENE GLYCOL 3350 17 G/17G
17 POWDER, FOR SOLUTION ORAL DAILY
Refills: 0 | Status: DISCONTINUED | OUTPATIENT
Start: 2021-09-13 | End: 2021-09-13

## 2021-09-13 RX ORDER — SENNA PLUS 8.6 MG/1
2 TABLET ORAL
Qty: 28 | Refills: 0
Start: 2021-09-13 | End: 2021-09-26

## 2021-09-13 RX ORDER — OXYCODONE HYDROCHLORIDE 5 MG/1
1 TABLET ORAL
Qty: 0 | Refills: 0 | DISCHARGE
Start: 2021-09-13

## 2021-09-13 RX ADMIN — Medication 1 TABLET(S): at 12:03

## 2021-09-13 RX ADMIN — GABAPENTIN 300 MILLIGRAM(S): 400 CAPSULE ORAL at 05:22

## 2021-09-13 RX ADMIN — OXYCODONE HYDROCHLORIDE 5 MILLIGRAM(S): 5 TABLET ORAL at 08:41

## 2021-09-13 RX ADMIN — METHOCARBAMOL 750 MILLIGRAM(S): 500 TABLET, FILM COATED ORAL at 10:11

## 2021-09-13 RX ADMIN — Medication 975 MILLIGRAM(S): at 05:22

## 2021-09-13 RX ADMIN — OXYCODONE HYDROCHLORIDE 10 MILLIGRAM(S): 5 TABLET ORAL at 05:22

## 2021-09-13 RX ADMIN — TRAMADOL HYDROCHLORIDE 50 MILLIGRAM(S): 50 TABLET ORAL at 15:55

## 2021-09-13 RX ADMIN — Medication 325 MILLIGRAM(S): at 05:22

## 2021-09-13 RX ADMIN — BUDESONIDE AND FORMOTEROL FUMARATE DIHYDRATE 2 PUFF(S): 160; 4.5 AEROSOL RESPIRATORY (INHALATION) at 12:02

## 2021-09-13 RX ADMIN — Medication 40 MILLIGRAM(S): at 05:22

## 2021-09-13 RX ADMIN — MAGNESIUM HYDROXIDE 30 MILLILITER(S): 400 TABLET, CHEWABLE ORAL at 05:33

## 2021-09-13 RX ADMIN — Medication 975 MILLIGRAM(S): at 13:11

## 2021-09-13 RX ADMIN — PANTOPRAZOLE SODIUM 40 MILLIGRAM(S): 20 TABLET, DELAYED RELEASE ORAL at 05:21

## 2021-09-13 RX ADMIN — Medication 975 MILLIGRAM(S): at 05:52

## 2021-09-13 RX ADMIN — OXYCODONE HYDROCHLORIDE 10 MILLIGRAM(S): 5 TABLET ORAL at 05:52

## 2021-09-13 NOTE — PROGRESS NOTE ADULT - SUBJECTIVE AND OBJECTIVE BOX
Patient is a 76y old  Female who presents with a chief complaint of I'm having a right knee replacement. (27 Aug 2021 12:03)      POST OPERATIVE DAY #:  1  Patient comfortable  No complaints    T(C): 36.4 (09-11-21 @ 04:19), Max: 37.2 (09-10-21 @ 11:55)  HR: 84 (09-11-21 @ 05:35) (49 - 86)  BP: 130/58 (09-11-21 @ 04:19) (93/55 - 166/76)  RR: 16 (09-11-21 @ 04:19) (15 - 18)  SpO2: 95% (09-11-21 @ 05:35) (95% - 100%)  Wt(kg): --    PHYSICAL EXAM:  NAD, Alert  EXT:  Rt Knee Aquacel Dressing intact moderate amt of drainage noted  Calves soft  (+) DF/PF;  EHL FHL:  No gross Sensation deficits noted   (+) Distal Pulses;   B/L, PAS     LABS:                        10.8<L>  12.14<H> )-----------( 225      ( 11 Sep 2021 05:59 )             34.3<L>    09-11    137  |  101  |  25<H>  ----------------------------<  157<H>  4.3   |  21<L>  |  1.16                   
Patient is a 76y old  Female who presents  right total knee replacement (13 Sep 2021 08:08)        Patient comfortable:    No complaints    I agree with the Physician's Assistant current note.    PHYSICAL EXAM:    Alert, NAD    Knee: Dressing C/D/I; sensation grossly intact to touch    Physical therapy. Patient is ambulating, progressing with ROM, sitting comfortably.       Plan for Disposition today    Quentin Dutton MD  [facility]  [phone]            
Patient seen at bedside.  Pain controlled with pain medicine  No complaints  Spoke to SW about home health aid when dc'd home    Vital Signs Last 24 Hrs  T(C): 37.1 (13 Sep 2021 04:05), Max: 37.3 (12 Sep 2021 16:03)  T(F): 98.8 (13 Sep 2021 04:05), Max: 99.1 (12 Sep 2021 16:03)  HR: 64 (13 Sep 2021 06:19) (53 - 68)  BP: 106/54 (13 Sep 2021 04:05) (95/57 - 150/71)  BP(mean): --  RR: 16 (13 Sep 2021 04:05) (16 - 16)  SpO2: 95% (13 Sep 2021 06:19) (95% - 100%)    PHYSICAL EXAM:  NAD, Alert  EXT:  Rt Knee Aquacel Dressing intact moderate amt of drainage noted  Calves soft  (+) DF/PF;  EHL FHL:  No gross Sensation deficits noted   (+) Distal Pulses;

## 2021-09-13 NOTE — DISCHARGE NOTE PROVIDER - NSDCCPCAREPLAN_GEN_ALL_CORE_FT
PRINCIPAL DISCHARGE DIAGNOSIS  Diagnosis: Osteoarthritis  Assessment and Plan of Treatment: right knee

## 2021-09-13 NOTE — DISCHARGE NOTE NURSING/CASE MANAGEMENT/SOCIAL WORK - PATIENT PORTAL LINK FT
You can access the FollowMyHealth Patient Portal offered by [facility] by registering at the following website: [email or web address]. By joining [facility]’s FollowMyHealth portal, you will also be able to view your health information using other applications (apps) compatible with our system.

## 2021-09-13 NOTE — DISCHARGE NOTE PROVIDER - HOSPITAL COURSE
History of Present Illness    This is a 77 y/o female PMH diabetes, hypertension, RAMOS, non-compliant with CPAP, osteoarthritis, c/o right knee pain for many years, S/P PT and steroid injections without relief.  Presents today for right total knee replacement with KERI.      Goal of care: Have no pain.    Patient underwent right total knee replacement without complications. Evaluated and treated by physical therapy whom recommended home for   discharge disposition. Discharged to home when cleared by physical therapy. History of Present Illness    This is a 77 y/o female PMH diabetes, hypertension, RAMOS, non-compliant with CPAP, osteoarthritis, c/o right knee pain for many years, S/P PT and steroid injections without relief.  Presents today for right total knee replacement with KERI.      Goal of care: Have no pain.      Hospital Course:  Patient underwent right total knee replacement without complications. Evaluated and treated by physical therapy whom recommended home for   discharge disposition. Discharged to home when cleared by physical therapy.

## 2021-09-13 NOTE — DISCHARGE NOTE PROVIDER - NSDCFUADDINST_GEN_ALL_CORE_FT
Follow up with Dr. Dutton on 9/28/21- please call to confirm appointment.  Activity: weight bearing as tolerated on right leg.  DVT prophylaxis: Aspirin 325mg oral twice daily for six weeks to prevent blood clots.  Dressing: keep clean and dry. To be removed on follow up visit for wound check.  Shower: with assistance. No direct water to dressing. No tub baths.  Blot dry gently, no rubbing dressing.

## 2021-09-13 NOTE — DISCHARGE NOTE PROVIDER - NPI NUMBER (FOR SYSADMIN USE ONLY) :

## 2021-09-13 NOTE — DISCHARGE NOTE NURSING/CASE MANAGEMENT/SOCIAL WORK - NSDCPEFALRISK_GEN_ALL_CORE
For information on Fall & injury Prevention, visit [email or web address]

## 2021-09-13 NOTE — PROGRESS NOTE ADULT - NSPROGADDITIONALINFOA_GEN_ALL_CORE
Patient is a 76y old  Female    right knee replacement        Patient comfortable:    No complaints    I agree with Physician's Assistant current note.      Alert, NAD    Knee: Moderate drainage in dressing. Will =change today.     Plan for physical therapy to get out of bed, ambulate full weight bearing as tolerated. work on knee ROM        Plan for Disposition:  This patient lives alone. She will work on a plan with the Discharger planning office.     Quentin Dutton MD  [facility]  [phone]

## 2021-09-13 NOTE — DISCHARGE NOTE PROVIDER - NSDCMRMEDTOKEN_GEN_ALL_CORE_FT
acetaminophen 325 mg oral tablet: 3 tab(s) orally every 8 hours  Albuterol (Eqv-Proventil HFA) 90 mcg/inh inhalation aerosol: 2 puff(s) inhaled every 6 hours  aspirin 325 mg oral tablet: 1 tab(s) orally 2 times a day  atorvastatin 20 mg oral tablet: 1 tab(s) orally once a day  Breo Ellipta 100 mcg-25 mcg/inh inhalation powder: 1 puff(s) inhaled once a day  furosemide 40 mg oral tablet: 1 tab(s) orally once a day  gabapentin 300 mg oral capsule: orally 2 times a day  metFORMIN 850 mg oral tablet: 1 tab(s) orally once a day (in the morning)  methocarbamol 750 mg oral tablet: 1 tab(s) orally 2 times a day  metoprolol succinate 200 mg oral tablet, extended release: 1 tab(s) orally once a day  omeprazole 40 mg oral delayed release capsule: 1 cap(s) orally once a day  oxyCODONE 5 mg oral tablet: 1 tab(s) orally every 3 hours, As needed, Moderate Pain (4 - 6)   acetaminophen 325 mg oral tablet: 3 tabs orally every 6 hours X 5 days, then as needed for pain  Albuterol (Eqv-Proventil HFA) 90 mcg/inh inhalation aerosol: 2 puff(s) inhaled every 6 hours  aspirin 325 mg oral tablet: 1 tab orally 2 times a day X 6 weeks to prevent blood clots    MDD:2  atorvastatin 20 mg oral tablet: 1 tab(s) orally once a day  Breo Ellipta 100 mcg-25 mcg/inh inhalation powder: 1 puff(s) inhaled once a day  furosemide 40 mg oral tablet: 1 tab(s) orally once a day  gabapentin 300 mg oral capsule: orally 2 times a day  metFORMIN 850 mg oral tablet: 1 tab(s) orally once a day (in the morning)  methocarbamol 750 mg oral tablet: 1 tab(s) orally 2 times a day  metoprolol succinate 200 mg oral tablet, extended release: 1 tab(s) orally once a day  omeprazole 40 mg oral delayed release capsule: 1 cap(s) orally once a day before breakfast MDD:1  oxyCODONE 5 mg oral tablet: 1 tab orally every 4-6 hours As Needed for Severe Pain   MDD:6  senna oral tablet: 2 tab(s) orally once a day (at bedtime) to prevent constipation    MDD:2  traMADol 50 mg oral tablet: 1 tab orally every 6 hours, As needed for Mild to Moderate Pain   MDD:4   acetaminophen 325 mg oral tablet: 3 tabs orally every 6 hours X 5 days, then as needed for pain  Albuterol (Eqv-Proventil HFA) 90 mcg/inh inhalation aerosol: 2 puff(s) inhaled every 6 hours  aspirin 325 mg oral tablet: 1 tab orally 2 times a day X 6 weeks to prevent blood clots    MDD:2  atorvastatin 20 mg oral tablet: 1 tab(s) orally once a day  Breo Ellipta 100 mcg-25 mcg/inh inhalation powder: 1 puff(s) inhaled once a day  furosemide 40 mg oral tablet: 1 tab(s) orally once a day  gabapentin 300 mg oral capsule: orally 2 times a day  metFORMIN 850 mg oral tablet: 1 tab(s) orally once a day (in the morning)  methocarbamol 750 mg oral tablet: 1 tab(s) orally 2 times a day  metoprolol succinate 200 mg oral tablet, extended release: 1 tab(s) orally once a day  oxyCODONE 5 mg oral tablet: 1 tab orally every 4-6 hours As Needed for Severe Pain   MDD:6  traMADol 50 mg oral tablet: 1 tab orally every 6 hours, As needed for Mild to Moderate Pain   MDD:4

## 2021-09-13 NOTE — PROGRESS NOTE ADULT - ASSESSMENT
76F POD 5 S/P R TKR. Patient stable and recovering well from orthopaedic standpoint.    Plan    ck labs  change dressing  Ecotrin for DVT prophylaxis  OOB/PT/WBAT  analgesics as needed        
At discharge station AVS was printed and reviewed with pt. Pt given Good RX  today for Amlodipine. Discussed with pt checking with [employer] on the cost of many of his medications. Pt taken to registration to make return appointments as directed by provider today. Cliff Paredes RN  .
S/P R TKR    Plan    ck labs  change dressing  Ecotrin for DVT prophylaxis  OOB/PT/WBAT  analgesics as needed         Yessica Wolf PA-C   [facility]    [id number]

## 2021-09-13 NOTE — DISCHARGE NOTE PROVIDER - NSDCFUADDAPPT_GEN_ALL_CORE_FT
It is highly recommended you follow up with your PCP within 4 weeks to   discuss recent surgery/general checkup/possible medication adjustment.

## 2021-09-13 NOTE — DISCHARGE NOTE PROVIDER - CARE PROVIDER_API CALL
Quentin Dutton)  Orthopaedic Surgery  [address], [address] [facility] [address]  [place] [address]  Phone: [phone]  Fax: [phone]  Follow Up Time:

## 2021-09-18 ENCOUNTER — NON-APPOINTMENT (OUTPATIENT)
Age: 76
End: 2021-09-18

## 2021-09-22 ENCOUNTER — APPOINTMENT (OUTPATIENT)
Dept: ORTHOPEDIC SURGERY | Facility: CLINIC | Age: 76
End: 2021-09-22
Payer: MEDICARE

## 2021-09-22 DIAGNOSIS — M17.0 BILATERAL PRIMARY OSTEOARTHRITIS OF KNEE: ICD-10-CM

## 2021-09-22 PROCEDURE — 73560 X-RAY EXAM OF KNEE 1 OR 2: CPT | Mod: LT

## 2021-09-22 PROCEDURE — 73564 X-RAY EXAM KNEE 4 OR MORE: CPT | Mod: RT

## 2021-09-22 PROCEDURE — 99024 POSTOP FOLLOW-UP VISIT: CPT

## 2021-09-22 NOTE — HISTORY OF PRESENT ILLNESS
[Clean/Dry/Intact] : clean, dry and intact [Healed] : healed [Neuro Intact] : an unremarkable neurological exam [Vascular Intact] : ~T peripheral vascular exam normal [Negative Donya's] : maneuvers demonstrated a negative Donya's sign [Doing Well] : is doing well [Excellent Pain Control] : has excellent pain control [Staples Removed] : staples were removed [Chills] : no chills [Constipation] : no constipation [Diarrhea] : no diarrhea [Dysuria] : no dysuria [Fever] : no fever [Nausea] : no nausea [Erythema] : not erythematous [Vomiting] : no vomiting [Discharge] : absent of discharge [Swelling] : not swollen [Dehiscence] : not dehisced [de-identified] : s/p right TKR 9/10/21. [de-identified] : Ms. MOHAMUD OLIVEROS is a 76 year female who returns to office status post right TKR 9/10/21.\par She presents today for dressing change as her Aquacel dressing has become saturated.\par Level of pain on scale of 1-10 is 2 out of 10 today.\par Home care PT has been going very well overall.\par Taking Tylenol mainly for pain and Tramadol PRN.\par Taking Aspirin for DVT prophylaxis.\par Denies fever, chills, nausea, vomiting, constipation, diarrhea, incision site infection/drainage.\par All review of systems, family history, social history, surgical history, past medical history, medications, and allergies not previously stated as positive are negative. They were reviewed by me today with the patient and documented accordingly. [de-identified] : Her wound is healing very well. The staples were removed and Steri-Strips are paced. She has full extension and flexes already to past 90 degrees. She is going to physical therapy. [de-identified] : 4 views were taken of the patient's right knee which show excellent position of fixation of the cemented DJ O total knee replacement and the patella tracks well. Her opposite left knee does show bone against bone arthritis of the medial compartment on the AP. [de-identified] :  return visit in 6 weeks.

## 2021-09-27 RX ORDER — TRAMADOL HYDROCHLORIDE 50 MG/1
50 TABLET, COATED ORAL
Qty: 21 | Refills: 0 | Status: ACTIVE | COMMUNITY
Start: 2021-09-27 | End: 1900-01-01

## 2021-09-28 ENCOUNTER — APPOINTMENT (OUTPATIENT)
Dept: ORTHOPEDIC SURGERY | Facility: CLINIC | Age: 76
End: 2021-09-28

## 2021-11-03 ENCOUNTER — APPOINTMENT (OUTPATIENT)
Dept: ORTHOPEDIC SURGERY | Facility: CLINIC | Age: 76
End: 2021-11-03
Payer: MEDICARE

## 2021-11-03 VITALS
BODY MASS INDEX: 38.41 KG/M2 | HEIGHT: 64 IN | HEART RATE: 60 BPM | DIASTOLIC BLOOD PRESSURE: 75 MMHG | WEIGHT: 225 LBS | SYSTOLIC BLOOD PRESSURE: 164 MMHG

## 2021-11-03 PROCEDURE — 99212 OFFICE O/P EST SF 10 MIN: CPT | Mod: 24,25

## 2021-11-03 PROCEDURE — 20610 DRAIN/INJ JOINT/BURSA W/O US: CPT | Mod: 79,LT

## 2021-11-03 RX ORDER — TRAMADOL HYDROCHLORIDE 50 MG/1
50 TABLET, COATED ORAL
Qty: 20 | Refills: 0 | Status: ACTIVE | COMMUNITY
Start: 2021-11-03 | End: 1900-01-01

## 2021-11-03 NOTE — HISTORY OF PRESENT ILLNESS
[Doing Well] : is doing well [Excellent Pain Control] : has excellent pain control [de-identified] : s/p right TKR 9/10/21. [de-identified] : Ms. MOHAMUD OLIVEROS is a 76 year female who returns to office status post right TKR 9/10/21.\par Level of pain on scale of 1-10 is 0 out of 10 today.\par Outpatient PT has been going very well overall.\par Denies fever, chills, nausea, vomiting, constipation, diarrhea, incision site infection/drainage.\par All review of systems, family history, social history, surgical history, past medical history, medications, and allergies not previously stated as positive are negative. They were reviewed by me today with the patient and documented accordingly. [de-identified] : Her wound is healing very well.  She has a full extension and she does flex easily to past 110 degrees.  She has good medial lateral as well as posterior stability.\par \par A separate examination is done of her opposite left knee because of her medial compartment arthritis.  Here she does have a satisfactory medial lateral stability but she does have a varus attitude to her knee and she does have pain directly over the medial joint line.  She has patellofemoral crepitus.  She feels she would like to in the near future consider a left total knee replacement but not at this time.  She would like a local injection with Celestone. [de-identified] : As far as her total knee replacement she is doing very well with excellent pain control.  Her opposite left knee does have arthritis and is giving her pain but she would like an injection which is given today. [de-identified] : She will return in 3 months for follow-up of her right total knee replacement and her reevaluation of arthritic left knee.

## 2021-11-03 NOTE — PROCEDURE
[de-identified] : Procedure Note:\par \par Anatomic Location:  Left Knee\par \par Diagnosis:  Arthritis\par \par Procedure:  Injection of 2cc  of Marcaine 0.25% plain and Celestone 1cc, 6mg\par \par Local Spray: Ethyl Chloride.\par \par \par Patient has consented for the procedure.\par \par Injection  through a lateral parapatella approach.\par \par Patient tolerated the procedure well.\par \par Patient instructed to call the office if any reaction, fever, chills, increased erythema or swelling.   [phone].

## 2022-02-09 ENCOUNTER — APPOINTMENT (OUTPATIENT)
Dept: ORTHOPEDIC SURGERY | Facility: CLINIC | Age: 77
End: 2022-02-09
Payer: MEDICARE

## 2022-02-09 VITALS — WEIGHT: 220 LBS | BODY MASS INDEX: 37.56 KG/M2 | HEIGHT: 64 IN

## 2022-02-09 PROCEDURE — 99213 OFFICE O/P EST LOW 20 MIN: CPT

## 2022-02-09 NOTE — PROCEDURE
[de-identified] : Procedure Note:\par \par Anatomic Location:  Left Knee\par \par Diagnosis:  Arthritis\par \par Procedure:  Injection of 2cc  of Marcaine 0.25% plain and Celestone 1cc, 6mg\par \par Local Spray: Ethyl Chloride.\par \par \par Patient has consented for the procedure.\par \par Injection  through a lateral parapatella approach.\par \par Patient tolerated the procedure well.\par \par Patient instructed to call the office if any reaction, fever, chills, increased erythema or swelling.   [phone].

## 2022-02-09 NOTE — PHYSICAL EXAM
[de-identified] : She appears to be ambulating generally very well off her right leg but she is absorbing more pain on the right leg because of the arthritis on the left where she has bone against bone contact throughout her medial compartment.  Her right and left knee of both on today's exam go from 0 to 115 degrees.  The patient is overweight and again it was discussed with her to try to bring her weight down.  She still uses a cane.  She is going to physical therapy and is steadily gaining strength.  Her right total knee replacement is good medial lateral stability and the patella tracks well.  Her left knee has pain over the medial joint line with a varus attitude to the knee and pain with any compression of the patella.

## 2022-02-09 NOTE — DISCUSSION/SUMMARY
[de-identified] : This patient is steadily improving especially with her physical therapy she still needs a cane however.  I suggest another 6 weeks of physical therapy to continue working on her right leg.  She received a cortisone injection in her left knee which is osteoarthritis and hopefully this will decrease the discomfort in her left knee and allow her to better balance her weight on both knees.  She will return in 4 months months however at this time will continue with physical therapy if possible for a further strengthening of her right knee.  She will try to bring her weight down.

## 2022-02-09 NOTE — HISTORY OF PRESENT ILLNESS
[de-identified] : Patient is s/p right TKR 9/10/21.\par She is doing very well with her right knee at this time.  She is steadily gaining strength and a physical therapy has been helping her\par No pain or complaints regarding the right knee.\par She has been managing with her sever left knee OA, last receiving a cortisone injection on 11/3/21, which helped for 3 months.

## 2022-05-10 NOTE — PHYSICAL THERAPY INITIAL EVALUATION ADULT - LEVEL OF CONSCIOUSNESS, REHAB EVAL
alert Bactrim Counseling:  I discussed with the patient the risks of sulfa antibiotics including but not limited to GI upset, allergic reaction, drug rash, diarrhea, dizziness, photosensitivity, and yeast infections.  Rarely, more serious reactions can occur including but not limited to aplastic anemia, agranulocytosis, methemoglobinemia, blood dyscrasias, liver or kidney failure, lung infiltrates or desquamative/blistering drug rashes.

## 2022-05-11 ENCOUNTER — APPOINTMENT (OUTPATIENT)
Dept: ORTHOPEDIC SURGERY | Facility: CLINIC | Age: 77
End: 2022-05-11
Payer: MEDICARE

## 2022-05-11 PROCEDURE — 99213 OFFICE O/P EST LOW 20 MIN: CPT | Mod: 95

## 2022-05-11 NOTE — HISTORY OF PRESENT ILLNESS
[de-identified] : This person returns our for evaluation of her right knees after having a right DJ O total knee replacement in September 10, 2021.  She also has a history of osteoarthritis in her opposite left knee.

## 2022-05-11 NOTE — DISCUSSION/SUMMARY
[de-identified] : At this time we will follow her conservatively.  She is 3 months past the injection she had with cortisone in her right knee and that she should come into the office over the next several weeks and receive another cortisone shot in the right knee at this time.  Otherwise she is steadily progressing and doing satisfactorily with her total knee replacement.

## 2022-05-11 NOTE — PHYSICAL EXAM
[de-identified] : Her physical examination shows she is walking around generally well.  She is able to straighten her right knee and can bend and easily past 110 degrees her wound is well-healed her alignment is excellent her opposite left knee does have is she says of feeling a crepitus when she puts her hand on her patella she is able to straighten it almost fully when she bends it she is bending it now to approximately 120 degrees.  She has a known severe degenerative osteoarthritis in her right knee and in the this consider having a total knee replacement at a later date at this time she still goes to occasional physical therapy and she says that with stairs sometimes she goes 1 step at a time.  She says she feels good however and is progressing satisfactorily.  She is able to sit and get out of a chair easily.  When walking there is a slight limp but she does have the osteoarthritis in her right knee.

## 2022-05-22 NOTE — PRE-OP CHECKLIST - BP NONINVASIVE SYSTOLIC (MM HG)
Problem: At Risk for Falls  Goal: # Patient does not fall  Outcome: Outcome Met, Continue evaluating goal progress toward completion  Goal: # Takes action to control fall-related risks  Outcome: Outcome Met, Continue evaluating goal progress toward completion  Goal: # Verbalizes understanding of fall risk/precautions  Description: Document education using the patient education activity  Outcome: Outcome Met, Continue evaluating goal progress toward completion     Problem: At Risk for Injury Due to Fall  Goal: # Patient does not fall  Outcome: Outcome Met, Continue evaluating goal progress toward completion  Goal: # Takes action to control condition specific risks  Outcome: Outcome Met, Continue evaluating goal progress toward completion  Goal: # Verbalizes understanding of fall-related injury personal risks  Description: Document education using the patient education activity  Outcome: Outcome Met, Continue evaluating goal progress toward completion     Problem: Self Care Deficit  Goal: # Functional status is maintained or returned to baseline - REHAB ONLY  Outcome: Outcome Not Met, Continue to Monitor  Goal: Functional status is maintained or returned to baseline  Outcome: Outcome Not Met, Continue to Monitor  Goal: # Tolerates activity for d/c setting with no clinical problems  Outcome: Outcome Not Met, Continue to Monitor      166

## 2022-06-15 ENCOUNTER — APPOINTMENT (OUTPATIENT)
Dept: ORTHOPEDIC SURGERY | Facility: CLINIC | Age: 77
End: 2022-06-15
Payer: MEDICARE

## 2022-06-15 VITALS
BODY MASS INDEX: 39.27 KG/M2 | HEIGHT: 64 IN | WEIGHT: 230 LBS | SYSTOLIC BLOOD PRESSURE: 144 MMHG | DIASTOLIC BLOOD PRESSURE: 68 MMHG | HEART RATE: 60 BPM

## 2022-06-15 PROCEDURE — 99213 OFFICE O/P EST LOW 20 MIN: CPT | Mod: 25

## 2022-06-15 PROCEDURE — 20610 DRAIN/INJ JOINT/BURSA W/O US: CPT | Mod: LT

## 2022-06-15 NOTE — REASON FOR VISIT
[Follow-Up Visit] : a follow-up visit for [Other: _____] : [unfilled] [FreeTextEntry2] : right TKR; left knee pain

## 2022-06-15 NOTE — HISTORY OF PRESENT ILLNESS
[de-identified] : This patient returns to office for evaluation of her right knee after having a right DJO total knee replacement on 9/10/21.\par Her right knee is doing very well at this time and she says outpatient PT has helped to improve her right knee strength and ROM.\par She also has a history of osteoarthritis in her opposite left knee. \par She received a cortisone injection to this left knee on 2/9/22, which helped her pain for about 1-2 months.\par She knows her need for a left TKR, but she wants to recover more first with her right TKR.

## 2022-06-15 NOTE — DISCUSSION/SUMMARY
[de-identified] : She received a cortisone injection in her left knee which has osteoarthritis and hopefully this will decrease the discomfort in her left knee and allow her to better balance her weight on both knees. She will return in 3-6 months for this left knee. She will try to bring her weight down as well. Her right TKR is doing very well at this time and will continue to progress with her ADLs. A new physical therapy was provided to patient today as well since this has been helping improve her knee strength and range of motion.

## 2022-06-15 NOTE — PROCEDURE
[de-identified] : Procedure Note:\par \par Anatomic Location: Left Knee\par \par Diagnosis: Arthritis\par \par Procedure: Injection of 2cc of Marcaine 0.25% plain and Celestone 1cc, 6mg\par \par Local Spray: Ethyl Chloride.\par \par Patient has consented for the procedure.\par \par Injection through a lateral parapatella approach.\par \par Patient tolerated the procedure well.\par \par Patient instructed to call the office if any reaction, fever, chills, increased erythema or swelling. [phone].

## 2022-06-15 NOTE — PHYSICAL EXAM
[de-identified] : Her physical examination shows she is walking around generally well. She is able to straighten her right knee and can bend and easily past 120 degrees. Her wound is well-healed. Her alignment is excellent. Her opposite left knee does have a feeling of crepitus. She is able to straighten it almost fully and when she bends it she is bending it now to approximately 120 degrees.

## 2022-09-14 ENCOUNTER — APPOINTMENT (OUTPATIENT)
Dept: ORTHOPEDIC SURGERY | Facility: CLINIC | Age: 77
End: 2022-09-14
Payer: MEDICARE

## 2022-09-14 PROCEDURE — 99214 OFFICE O/P EST MOD 30 MIN: CPT | Mod: 25

## 2022-09-14 PROCEDURE — 20610 DRAIN/INJ JOINT/BURSA W/O US: CPT | Mod: 50

## 2022-09-14 PROCEDURE — 73564 X-RAY EXAM KNEE 4 OR MORE: CPT | Mod: RT

## 2022-09-14 NOTE — PROCEDURE
[de-identified] : Procedure Note:\par \par Anatomic Location: Left Knee\par \par Diagnosis: Arthritis\par \par Procedure: Injection of 2cc of Marcaine 0.25% plain and Celestone 1cc, 6mg\par \par Local Spray: Ethyl Chloride.\par \par Patient has consented for the procedure.\par \par Injection through a lateral parapatella approach.\par \par Patient tolerated the procedure well.\par \par Patient instructed to call the office if any reaction, fever, chills, increased erythema or swelling. [phone].

## 2022-09-14 NOTE — PHYSICAL EXAM
[de-identified] : She is doing very well with her right right total knee replacement.  She walks without a limp there is no evidence of arthritis she has no effusion and no swelling and no evidence of a Baker's cyst her motion goes from full extension to 120 degrees of flexion.  She does not have any significant pain.  She sometimes feels an itching feeling this may be because of the purposeful cutting of the infrapatellar branch of the saphenous nerve giving her a numb spot just lateral to the wound.\par \par Her left knee shows knee that goes from just short of full extension to 115 degrees of flexion she does have a satisfactory medial lateral and anterior posterior stability but a varus attitude to her knee she has pain over the medial joint line to palpation a positive medial Steinmann test and some patellofemoral crepitus.  \par \par  [de-identified] : 4 views done of the right and the left knee is showing the right knee is a cemented DJ O total knee replacement is in excellent position well fixed there is no evidence of osteolysis.  Her left knee does show bone against against bone in the medial compartment seen very well on the AP view as well as the Galloway view.  The skyline view and lateral view do show patellofemoral arthritis with peripheral osteophytes forming on the skyline view.  Impression severe osteoarthritis left knee.

## 2022-09-14 NOTE — DISCUSSION/SUMMARY
[de-identified] : She received a cortisone injection in her left knee which has osteoarthritis and hopefully this will decrease the discomfort in her left knee and allow her to better balance her weight on both knees. She will return in 3-6 months for this left knee. She will try to bring her weight down as well. Her right TKR is doing very well at this time and will continue to progress with her ADLs. A new physical therapy was provided to patient today as well since this has been helping improve her knee strength and range of motion.

## 2022-09-14 NOTE — HISTORY OF PRESENT ILLNESS
[de-identified] : This patient returns to office for evaluation of her right knee after having a right DJO total knee replacement on 9/10/21.\par Her right knee is doing very well at this time and she says outpatient PT has helped to improve her right knee strength and ROM.\par She also has a history of osteoarthritis in her opposite left knee. \par She received a cortisone injection to this left knee on 6/15/22, which helped her pain for about 1-2 months.\par She knows her need for a left TKR, but is not ready for this at this time, perhaps in February/March 2023.

## 2022-12-14 ENCOUNTER — APPOINTMENT (OUTPATIENT)
Dept: ORTHOPEDIC SURGERY | Facility: CLINIC | Age: 77
End: 2022-12-14
Payer: MEDICARE

## 2022-12-14 VITALS — HEIGHT: 64 IN | BODY MASS INDEX: 39.27 KG/M2 | WEIGHT: 230 LBS

## 2022-12-14 PROCEDURE — 99214 OFFICE O/P EST MOD 30 MIN: CPT | Mod: 25

## 2022-12-14 PROCEDURE — 20610 DRAIN/INJ JOINT/BURSA W/O US: CPT | Mod: LT

## 2022-12-14 NOTE — PROCEDURE
[de-identified] : Procedure Note:\par \par Anatomic Location: Left Knee\par \par Diagnosis: Arthritis\par \par Procedure: Injection of 2cc of Marcaine 0.25% plain and Celestone 1cc, 6mg\par \par Local Spray: Ethyl Chloride.\par \par Patient has consented for the procedure.\par \par Injection through a lateral parapatella approach.\par \par Patient tolerated the procedure well.\par \par Patient instructed to call the office if any reaction, fever, chills, increased erythema or swelling. [phone].

## 2022-12-14 NOTE — HISTORY OF PRESENT ILLNESS
[de-identified] : This patient returns to office for evaluation of her right knee after having a right DJO total knee replacement on 9/10/21.\par Her right knee is doing very well at this time and she says outpatient PT has helped to improve her right knee strength and ROM.\par She also has a history of osteoarthritis in her opposite left knee. \par She received a cortisone injection to this left knee on 9/14/22, which helped her pain for about 1-2 months.\par She knows her need for a left TKR, but is not ready for this at this time, perhaps in February/March 2023. \par She takes Tylenol PRN pain which helps. independent

## 2022-12-14 NOTE — PHYSICAL EXAM
[de-identified] : This patient continues to do very well with her right total knee replacement.  She has 0 to 120 degrees of motion good medial lateral and anterior posterior stability and no significant Baker's cyst.  \par \par Her left knee shows knee that goes from 5 -  115 degrees of flexion. she does have a satisfactory medial lateral and anterior posterior stability.  There is a varus attitude to her knee she has pain over her medial joint line she has a positive medial Steinmann test and also patellofemoral pain.  She has a known severe bone-on-bone arthritis in the medial compartment of her left knee.

## 2022-12-14 NOTE — DISCUSSION/SUMMARY
[de-identified] : Orthopedic decision making.  This patient continues to do very well with her right total knee replacement.  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  I do feel she would greatly benefit from total knee replacement which she would like to do in approximately 6 months.  At this time she would strongly favor getting a local injection of a cortisone and coming in in 2 months to further discuss the surgery.\par

## 2023-01-14 NOTE — H&P PST ADULT - DENTITION
[FreeTextEntry1] : Patient has signs/symptoms consistent with coxsackie virus (aka 'hand-foot-mouth' disease). Continue supportive care. Encourage PO intake. Discussed with family to call for decreased PO/urination. RTC for worsening/persistent symptoms.
partial dentures

## 2023-03-08 ENCOUNTER — APPOINTMENT (OUTPATIENT)
Dept: ORTHOPEDIC SURGERY | Facility: CLINIC | Age: 78
End: 2023-03-08
Payer: MEDICARE

## 2023-03-08 VITALS
SYSTOLIC BLOOD PRESSURE: 127 MMHG | WEIGHT: 240 LBS | DIASTOLIC BLOOD PRESSURE: 70 MMHG | HEIGHT: 64 IN | TEMPERATURE: 97.5 F | BODY MASS INDEX: 40.97 KG/M2 | HEART RATE: 68 BPM

## 2023-03-08 PROCEDURE — 99214 OFFICE O/P EST MOD 30 MIN: CPT | Mod: 25

## 2023-03-08 PROCEDURE — 20610 DRAIN/INJ JOINT/BURSA W/O US: CPT | Mod: LT

## 2023-03-08 NOTE — PROCEDURE
[de-identified] : Procedure Note:\par \par Anatomic Location: Left Knee\par \par Diagnosis: Arthritis\par \par Procedure: Injection of 2cc of Marcaine 0.25% plain and Celestone 1cc, 6mg\par \par Local Spray: Ethyl Chloride.\par \par Patient has consented for the procedure.\par \par Injection through a lateral parapatella approach.\par \par Patient tolerated the procedure well.\par \par Patient instructed to call the office if any reaction, fever, chills, increased erythema or swelling. [phone].

## 2023-03-08 NOTE — HISTORY OF PRESENT ILLNESS
[de-identified] : Patient is s/p right TKR 9/10/21.\par Her right knee is doing very well at this time.\par She also has a history of osteoarthritis in her opposite left knee. \par She received a cortisone injection to this left knee on 12/14/22, which helped her pain for about 1-2 months.\par She knows her need for a left TKR, but is not ready for this at this time, perhaps some time this summer.\par She takes Tylenol PRN pain which helps.

## 2023-03-08 NOTE — DISCUSSION/SUMMARY
[de-identified] : Orthopedic decision making.  Her right total knee replacement is doing very well she is having more problem with her left knee she did receive an injection on today's exam but she would like to return in 3 months at which time we will rem discuss a left total knee replacement.  As far as the decision making at this time she will manage a little longer on conservative measures and would like to schedule a total knee.  Return visit 3 months. \par \par

## 2023-03-08 NOTE — PHYSICAL EXAM
[de-identified] : This patient continues to do very well with her right total knee replacement.  She occasionally gets a very slight amount of pain along the lateral aspect of her tibia just below the joint but this is minimal she functions very well at this and her motion goes from 0 to past 120 degrees with good medial lateral and anterior posterior stability.  She has no effusion. \par   \par \par Her left knee shows knee that goes from 5 -  115 degrees of flexion. she does have a satisfactory medial lateral and anterior posterior stability.  Her knee is in a varus alignment and she has medial joint degenerative arthritis with pain over medial joint line and a positive medial Steinmann test.  There is no obvious Baker's cyst there may be a small effusion.  Her motion does give her pain when she approaches 115 degrees. \par \par

## 2023-06-20 ENCOUNTER — APPOINTMENT (OUTPATIENT)
Dept: ORTHOPEDIC SURGERY | Facility: CLINIC | Age: 78
End: 2023-06-20
Payer: MEDICARE

## 2023-06-20 VITALS
HEIGHT: 64 IN | WEIGHT: 240 LBS | BODY MASS INDEX: 40.97 KG/M2 | SYSTOLIC BLOOD PRESSURE: 151 MMHG | DIASTOLIC BLOOD PRESSURE: 69 MMHG | HEART RATE: 75 BPM

## 2023-06-20 PROCEDURE — 99214 OFFICE O/P EST MOD 30 MIN: CPT | Mod: 25

## 2023-06-20 PROCEDURE — 20610 DRAIN/INJ JOINT/BURSA W/O US: CPT | Mod: LT

## 2023-06-20 NOTE — DISCUSSION/SUMMARY
[de-identified] : Orthopedic decision making.  Her right total knee replacement is doing very well she is having more problem with her left knee she did receive an injection on today's exam but she would like to return and again discussed the total knee replacement in the right knee in the fall.  Again the hospitalization and perioperative period were described to her and she is in agreement with the plan and would like to proceed with the procedure in the near

## 2023-06-20 NOTE — HISTORY OF PRESENT ILLNESS
[de-identified] : Patient is s/p right TKR 9/10/21.\par Her right knee is doing very well at this time.\par She also has a history of osteoarthritis in her opposite left knee. \par She received a cortisone injection to this left knee on 3/8/23, which helped her pain for about 1-2 months.\par She knows her need for a left TKR, but is not ready for this at this time, perhaps some time later this year in the fall.\par She takes Tylenol PRN pain which helps.

## 2023-06-20 NOTE — PHYSICAL EXAM
[de-identified] : This patient continues to do very well with her right total knee replacement.  She has very little discomfort occasionally she feels some aching but she is overweight.  It was expressed to her that she should try to bring her weight down.  He has 0 to 120 degrees of motion in this knee.\par   \par \par Her left knee shows knee that goes from 5 -  115 degrees of flexion. she does have a satisfactory medial lateral and anterior posterior stability.  Her knee is in a varus alignment and she has medial joint degenerative arthritis with pain over medial joint line and a positive medial Steinmann test.  He has bone against bone arthritis in this knee.  There is no obvious Baker's cyst there may be a small effusion.  \par \par

## 2023-06-20 NOTE — PROCEDURE
[de-identified] : Procedure Note:\par \par Anatomic Location: Left Knee\par \par Diagnosis: Arthritis\par \par Procedure: Injection of 2cc of Marcaine 0.25% plain and Kenalog 40, 1 cc\par \par Local Spray: Ethyl Chloride.\par \par Patient has consented for the procedure.\par \par Injection through a lateral parapatella approach.\par \par Patient tolerated the procedure well.\par \par Patient instructed to call the office if any reaction, fever, chills, increased erythema or swelling. [phone].

## 2023-07-17 NOTE — PATIENT PROFILE ADULT - BRAND OF COVID-19 VACCINATION
Addended by: Latonia Patricio on: 7/17/2023 02:48 PM     Modules accepted: Orders Moderna dose 1 and 2

## 2023-08-09 ENCOUNTER — APPOINTMENT (OUTPATIENT)
Dept: ORTHOPEDIC SURGERY | Facility: CLINIC | Age: 78
End: 2023-08-09
Payer: MEDICARE

## 2023-08-09 VITALS — WEIGHT: 240 LBS | BODY MASS INDEX: 40.97 KG/M2 | HEIGHT: 64 IN

## 2023-08-09 DIAGNOSIS — M17.12 UNILATERAL PRIMARY OSTEOARTHRITIS, LEFT KNEE: ICD-10-CM

## 2023-08-09 PROCEDURE — 99214 OFFICE O/P EST MOD 30 MIN: CPT

## 2023-08-09 PROCEDURE — 73564 X-RAY EXAM KNEE 4 OR MORE: CPT | Mod: 50

## 2023-09-25 ENCOUNTER — NON-APPOINTMENT (OUTPATIENT)
Age: 78
End: 2023-09-25

## 2023-10-16 ENCOUNTER — OUTPATIENT (OUTPATIENT)
Dept: OUTPATIENT SERVICES | Facility: HOSPITAL | Age: 78
LOS: 1 days | End: 2023-10-16
Payer: MEDICARE

## 2023-10-16 VITALS
HEIGHT: 64 IN | DIASTOLIC BLOOD PRESSURE: 74 MMHG | HEART RATE: 55 BPM | TEMPERATURE: 98 F | SYSTOLIC BLOOD PRESSURE: 130 MMHG | RESPIRATION RATE: 18 BRPM | OXYGEN SATURATION: 95 % | WEIGHT: 242.07 LBS

## 2023-10-16 DIAGNOSIS — Z01.818 ENCOUNTER FOR OTHER PREPROCEDURAL EXAMINATION: ICD-10-CM

## 2023-10-16 DIAGNOSIS — G47.33 OBSTRUCTIVE SLEEP APNEA (ADULT) (PEDIATRIC): ICD-10-CM

## 2023-10-16 DIAGNOSIS — J45.909 UNSPECIFIED ASTHMA, UNCOMPLICATED: Chronic | ICD-10-CM

## 2023-10-16 DIAGNOSIS — M19.90 UNSPECIFIED OSTEOARTHRITIS, UNSPECIFIED SITE: ICD-10-CM

## 2023-10-16 DIAGNOSIS — R01.1 CARDIAC MURMUR, UNSPECIFIED: Chronic | ICD-10-CM

## 2023-10-16 DIAGNOSIS — S68.119A COMPLETE TRAUMATIC METACARPOPHALANGEAL AMPUTATION OF UNSPECIFIED FINGER, INITIAL ENCOUNTER: Chronic | ICD-10-CM

## 2023-10-16 DIAGNOSIS — Z29.9 ENCOUNTER FOR PROPHYLACTIC MEASURES, UNSPECIFIED: ICD-10-CM

## 2023-10-16 DIAGNOSIS — I10 ESSENTIAL (PRIMARY) HYPERTENSION: Chronic | ICD-10-CM

## 2023-10-16 DIAGNOSIS — Z98.890 OTHER SPECIFIED POSTPROCEDURAL STATES: Chronic | ICD-10-CM

## 2023-10-16 DIAGNOSIS — Z96.651 PRESENCE OF RIGHT ARTIFICIAL KNEE JOINT: Chronic | ICD-10-CM

## 2023-10-16 DIAGNOSIS — E78.5 HYPERLIPIDEMIA, UNSPECIFIED: Chronic | ICD-10-CM

## 2023-10-16 DIAGNOSIS — G47.33 OBSTRUCTIVE SLEEP APNEA (ADULT) (PEDIATRIC): Chronic | ICD-10-CM

## 2023-10-16 DIAGNOSIS — E11.9 TYPE 2 DIABETES MELLITUS WITHOUT COMPLICATIONS: ICD-10-CM

## 2023-10-16 DIAGNOSIS — M17.12 UNILATERAL PRIMARY OSTEOARTHRITIS, LEFT KNEE: ICD-10-CM

## 2023-10-16 LAB
A1C WITH ESTIMATED AVERAGE GLUCOSE RESULT: 6.2 % — HIGH (ref 4–5.6)
A1C WITH ESTIMATED AVERAGE GLUCOSE RESULT: 6.2 % — HIGH (ref 4–5.6)
ANION GAP SERPL CALC-SCNC: 11 MMOL/L — SIGNIFICANT CHANGE UP (ref 5–17)
BLD GP AB SCN SERPL QL: NEGATIVE — SIGNIFICANT CHANGE UP
BUN SERPL-MCNC: 18 MG/DL — SIGNIFICANT CHANGE UP (ref 7–23)
CALCIUM SERPL-MCNC: 10.9 MG/DL — HIGH (ref 8.4–10.5)
CHLORIDE SERPL-SCNC: 104 MMOL/L — SIGNIFICANT CHANGE UP (ref 96–108)
CO2 SERPL-SCNC: 25 MMOL/L — SIGNIFICANT CHANGE UP (ref 22–31)
CREAT SERPL-MCNC: 0.98 MG/DL — SIGNIFICANT CHANGE UP (ref 0.5–1.3)
EGFR: 59 ML/MIN/1.73M2 — LOW
ESTIMATED AVERAGE GLUCOSE: 131 MG/DL — HIGH (ref 68–114)
ESTIMATED AVERAGE GLUCOSE: 131 MG/DL — HIGH (ref 68–114)
GLUCOSE SERPL-MCNC: 110 MG/DL — HIGH (ref 70–99)
HCT VFR BLD CALC: 37.7 % — SIGNIFICANT CHANGE UP (ref 34.5–45)
HGB BLD-MCNC: 11.9 G/DL — SIGNIFICANT CHANGE UP (ref 11.5–15.5)
MCHC RBC-ENTMCNC: 28.1 PG — SIGNIFICANT CHANGE UP (ref 27–34)
MCHC RBC-ENTMCNC: 31.6 GM/DL — LOW (ref 32–36)
MCV RBC AUTO: 88.9 FL — SIGNIFICANT CHANGE UP (ref 80–100)
MRSA PCR RESULT.: SIGNIFICANT CHANGE UP
MRSA PCR RESULT.: SIGNIFICANT CHANGE UP
NRBC # BLD: 0 /100 WBCS — SIGNIFICANT CHANGE UP (ref 0–0)
PLATELET # BLD AUTO: 230 K/UL — SIGNIFICANT CHANGE UP (ref 150–400)
POTASSIUM SERPL-MCNC: 4 MMOL/L — SIGNIFICANT CHANGE UP (ref 3.5–5.3)
POTASSIUM SERPL-SCNC: 4 MMOL/L — SIGNIFICANT CHANGE UP (ref 3.5–5.3)
RBC # BLD: 4.24 M/UL — SIGNIFICANT CHANGE UP (ref 3.8–5.2)
RBC # FLD: 14.2 % — SIGNIFICANT CHANGE UP (ref 10.3–14.5)
RH IG SCN BLD-IMP: NEGATIVE — SIGNIFICANT CHANGE UP
S AUREUS DNA NOSE QL NAA+PROBE: SIGNIFICANT CHANGE UP
S AUREUS DNA NOSE QL NAA+PROBE: SIGNIFICANT CHANGE UP
SODIUM SERPL-SCNC: 140 MMOL/L — SIGNIFICANT CHANGE UP (ref 135–145)
WBC # BLD: 4.77 K/UL — SIGNIFICANT CHANGE UP (ref 3.8–10.5)
WBC # FLD AUTO: 4.77 K/UL — SIGNIFICANT CHANGE UP (ref 3.8–10.5)

## 2023-10-16 PROCEDURE — 87641 MR-STAPH DNA AMP PROBE: CPT

## 2023-10-16 PROCEDURE — 86901 BLOOD TYPING SEROLOGIC RH(D): CPT

## 2023-10-16 PROCEDURE — 86900 BLOOD TYPING SEROLOGIC ABO: CPT

## 2023-10-16 PROCEDURE — 85027 COMPLETE CBC AUTOMATED: CPT

## 2023-10-16 PROCEDURE — G0463: CPT

## 2023-10-16 PROCEDURE — 87640 STAPH A DNA AMP PROBE: CPT

## 2023-10-16 PROCEDURE — 86850 RBC ANTIBODY SCREEN: CPT

## 2023-10-16 PROCEDURE — 36415 COLL VENOUS BLD VENIPUNCTURE: CPT

## 2023-10-16 PROCEDURE — 80048 BASIC METABOLIC PNL TOTAL CA: CPT

## 2023-10-16 PROCEDURE — 83036 HEMOGLOBIN GLYCOSYLATED A1C: CPT

## 2023-10-16 RX ORDER — FUROSEMIDE 40 MG
1 TABLET ORAL
Qty: 0 | Refills: 0 | DISCHARGE

## 2023-10-16 RX ORDER — ALBUTEROL 90 UG/1
2 AEROSOL, METERED ORAL
Qty: 0 | Refills: 0 | DISCHARGE

## 2023-10-16 RX ORDER — FLUTICASONE FUROATE AND VILANTEROL TRIFENATATE 100; 25 UG/1; UG/1
1 POWDER RESPIRATORY (INHALATION)
Qty: 0 | Refills: 0 | DISCHARGE

## 2023-10-16 RX ORDER — METFORMIN HYDROCHLORIDE 850 MG/1
1 TABLET ORAL
Qty: 0 | Refills: 0 | DISCHARGE

## 2023-10-16 RX ORDER — METHOCARBAMOL 500 MG/1
1 TABLET, FILM COATED ORAL
Qty: 0 | Refills: 0 | DISCHARGE

## 2023-10-16 RX ORDER — GABAPENTIN 400 MG/1
0 CAPSULE ORAL
Qty: 0 | Refills: 0 | DISCHARGE

## 2023-10-16 RX ORDER — METOPROLOL TARTRATE 50 MG
1 TABLET ORAL
Qty: 0 | Refills: 0 | DISCHARGE

## 2023-10-16 NOTE — H&P PST ADULT - NEGATIVE NEUROLOGICAL SYMPTOMS
no transient paralysis/no weakness/no paresthesias/no generalized seizures/no focal seizures/no syncope/no vertigo/no loss of sensation

## 2023-10-16 NOTE — H&P PST ADULT - NSICDXPASTMEDICALHX_GEN_ALL_CORE_FT
PAST MEDICAL HISTORY:  Asthma     Heart murmur     Hypertension     Lumbar herniated disc     Mild HTN     RAMOS on CPAP     RAMOS on CPAP     Osteoarthritis     Type 2 diabetes mellitus

## 2023-10-16 NOTE — H&P PST ADULT - MUSCULOSKELETAL
uses cane/decreased ROM/decreased ROM due to pain/strength 5/5 bilateral upper extremities/back exam/decreased strength/abnormal gait details…

## 2023-10-16 NOTE — H&P PST ADULT - NSICDXPASTSURGICALHX_GEN_ALL_CORE_FT
PAST SURGICAL HISTORY:  Finger amputation, traumatic     H/O total knee replacement, right     S/P carpal tunnel release

## 2023-10-16 NOTE — H&P PST ADULT - PROBLEM SELECTOR PLAN 3
The Caprini score indicates that this patient is at high risk for a VTE event (score 6 or greater). Surgical patients in this group will benefit from both pharmacologic prophylaxis and intermittent compression devices.  The surgical team will determine the balance between VTE risk and bleeding risk, and other clinical considerations RAMOS precautions.  OR booking notified

## 2023-10-16 NOTE — H&P PST ADULT - PROBLEM SELECTOR PLAN 2
HA1c done at [facility]  Last dose metformin 10/29/23 HA1c done at [facility]  Last dose metformin 10/29/23  FS DOS

## 2023-10-16 NOTE — H&P PST ADULT - HISTORY OF PRESENT ILLNESS
This is a 77 y/o female PMH, HLD, DM2, hypertension, asthma, RAMOS on CPAP, PVD, plan for vascular procedure 10/20/23, osteoarthritis, c/o right knee pain for many years, s/p R TKA in 2021.  Plan for left total knee replacement on 10/30/23 with Dr. Dutton.

## 2023-10-16 NOTE — H&P PST ADULT - OTHER CARE PROVIDERS
Dr. Patricio/Garett Mullen (cardiologist) [facility] [phone]-Next visit 10/18/23 for m/e

## 2023-10-16 NOTE — H&P PST ADULT - ASSESSMENT
DASI score: 4.64  DASI activity: Can perform ADL's, walk 2 blocks, can go up stairs slowly, no strenuous activity  Loose teeth or dentures: Denies  Airway: MP2    CAROL ANNI VTE 2.0 SCORE [CLOT updated 2019]    AGE RELATED RISK FACTORS                                                       MOBILITY RELATED FACTORS  [ ] Age 41-60 years                                            (1 Point)                    [ ] Bed rest                                                        (1 Point)  [ ] Age: 61-74 years                                           (2 Points)                  [ ] Plaster cast                                                   (2 Points)  [x ] Age= 75 years                                              (3 Points)                    [ ] Bed bound for more than 72 hours                 (2 Points)    DISEASE RELATED RISK FACTORS                                               GENDER SPECIFIC FACTORS  [x ] Edema in the lower extremities                       (1 Point)              [ ] Pregnancy                                                     (1 Point)  [ ] Varicose veins                                               (1 Point)                     [ ] Post-partum < 6 weeks                                   (1 Point)             [x ] BMI > 25 Kg/m2                                            (1 Point)                     [ ] Hormonal therapy  or oral contraception          (1 Point)                 [ ] Sepsis (in the previous month)                        (1 Point)               [ ] History of pregnancy complications                 (1 point)  [ ] Pneumonia or serious lung disease                                               [ ] Unexplained or recurrent                     (1 Point)           (in the previous month)                               (1 Point)  [ ] Abnormal pulmonary function test                     (1 Point)                 SURGERY RELATED RISK FACTORS  [ ] Acute myocardial infarction                              (1 Point)               [ ]  Section                                             (1 Point)  [ ] Congestive heart failure (in the previous month)  (1 Point)      [ ] Minor surgery                                                  (1 Point)   [ ] Inflammatory bowel disease                             (1 Point)               [ ] Arthroscopic surgery                                        (2 Points)  [ ] Central venous access                                      (2 Points)                [ ] General surgery lasting more than 45 minutes (2 points)  [ ] Malignancy- Present or previous                   (2 Points)                [x ] Elective arthroplasty                                         (5 points)    [ ] Stroke (in the previous month)                          (5 Points)                                                                                                                                                           HEMATOLOGY RELATED FACTORS                                                 TRAUMA RELATED RISK FACTORS  [ ] Prior episodes of VTE                                     (3 Points)                [ ] Fracture of the hip, pelvis, or leg                       (5 Points)  [ ] Positive family history for VTE                         (3 Points)             [ ] Acute spinal cord injury (in the previous month)  (5 Points)  [ ] Prothrombin 81225 A                                     (3 Points)               [ ] Paralysis  (less than 1 month)                             (5 Points)  [ ] Factor V Leiden                                             (3 Points)                  [ ] Multiple Trauma within 1 month                        (5 Points)  [ ] Lupus anticoagulants                                     (3 Points)                                                           [ ] Anticardiolipin antibodies                               (3 Points)                                                       [ ] High homocysteine in the blood                      (3 Points)                                             [ ] Other congenital or acquired thrombophilia      (3 Points)                                                [ ] Heparin induced thrombocytopenia                  (3 Points)                                     Total Score [      8    ]

## 2023-10-16 NOTE — H&P PST ADULT - PROBLEM SELECTOR PLAN 1
Plan now for left total knee replacement on 10/30/23 with Dr. Dutton.   PST labs sent per protocol  Pre procedure surgical scrub and incentive spirometer instructions discussed  Pre-op education provided - all questions answered

## 2023-10-29 ENCOUNTER — TRANSCRIPTION ENCOUNTER (OUTPATIENT)
Age: 78
End: 2023-10-29

## 2023-10-30 ENCOUNTER — TRANSCRIPTION ENCOUNTER (OUTPATIENT)
Age: 78
End: 2023-10-30

## 2023-10-30 ENCOUNTER — APPOINTMENT (OUTPATIENT)
Dept: ORTHOPEDIC SURGERY | Facility: HOSPITAL | Age: 78
End: 2023-10-30

## 2023-10-30 ENCOUNTER — INPATIENT (INPATIENT)
Facility: HOSPITAL | Age: 78
LOS: 2 days | Discharge: HOME | DRG: 470 | End: 2023-11-02
Attending: ORTHOPAEDIC SURGERY | Admitting: ORTHOPAEDIC SURGERY
Payer: MEDICARE

## 2023-10-30 VITALS
DIASTOLIC BLOOD PRESSURE: 61 MMHG | RESPIRATION RATE: 16 BRPM | OXYGEN SATURATION: 99 % | HEIGHT: 64 IN | SYSTOLIC BLOOD PRESSURE: 134 MMHG | TEMPERATURE: 98 F | HEART RATE: 56 BPM | WEIGHT: 242.07 LBS

## 2023-10-30 DIAGNOSIS — Z98.890 OTHER SPECIFIED POSTPROCEDURAL STATES: Chronic | ICD-10-CM

## 2023-10-30 DIAGNOSIS — M17.12 UNILATERAL PRIMARY OSTEOARTHRITIS, LEFT KNEE: ICD-10-CM

## 2023-10-30 DIAGNOSIS — S68.119A COMPLETE TRAUMATIC METACARPOPHALANGEAL AMPUTATION OF UNSPECIFIED FINGER, INITIAL ENCOUNTER: Chronic | ICD-10-CM

## 2023-10-30 DIAGNOSIS — Z96.651 PRESENCE OF RIGHT ARTIFICIAL KNEE JOINT: Chronic | ICD-10-CM

## 2023-10-30 LAB
GLUCOSE BLDC GLUCOMTR-MCNC: 118 MG/DL — HIGH (ref 70–99)
GLUCOSE BLDC GLUCOMTR-MCNC: 118 MG/DL — HIGH (ref 70–99)
GLUCOSE BLDC GLUCOMTR-MCNC: 136 MG/DL — HIGH (ref 70–99)
GLUCOSE BLDC GLUCOMTR-MCNC: 136 MG/DL — HIGH (ref 70–99)
GLUCOSE BLDC GLUCOMTR-MCNC: 144 MG/DL — HIGH (ref 70–99)
GLUCOSE BLDC GLUCOMTR-MCNC: 144 MG/DL — HIGH (ref 70–99)
GLUCOSE BLDC GLUCOMTR-MCNC: 219 MG/DL — HIGH (ref 70–99)
GLUCOSE BLDC GLUCOMTR-MCNC: 219 MG/DL — HIGH (ref 70–99)

## 2023-10-30 PROCEDURE — 73560 X-RAY EXAM OF KNEE 1 OR 2: CPT | Mod: 26,LT

## 2023-10-30 PROCEDURE — 27447 TOTAL KNEE ARTHROPLASTY: CPT | Mod: LT

## 2023-10-30 DEVICE — INSERT TIB NONPOROUS UNIV SZ 6 LT: Type: IMPLANTABLE DEVICE | Status: FUNCTIONAL

## 2023-10-30 DEVICE — CEMENT PALACOS R: Type: IMPLANTABLE DEVICE | Status: FUNCTIONAL

## 2023-10-30 DEVICE — COMP FEM NON POROUS SZ 7 LT: Type: IMPLANTABLE DEVICE | Status: FUNCTIONAL

## 2023-10-30 DEVICE — COMP PATELLA TRI-PEG E-PLUS POLY 9X35MM: Type: IMPLANTABLE DEVICE | Status: FUNCTIONAL

## 2023-10-30 DEVICE — INSERT TIB EMPOWR SZ 6 13MM: Type: IMPLANTABLE DEVICE | Status: FUNCTIONAL

## 2023-10-30 RX ORDER — ISOSORBIDE MONONITRATE 60 MG/1
1 TABLET, EXTENDED RELEASE ORAL
Refills: 0 | DISCHARGE

## 2023-10-30 RX ORDER — CHLORHEXIDINE GLUCONATE 213 G/1000ML
1 SOLUTION TOPICAL ONCE
Refills: 0 | Status: DISCONTINUED | OUTPATIENT
Start: 2023-10-30 | End: 2023-10-30

## 2023-10-30 RX ORDER — METHOCARBAMOL 500 MG/1
750 TABLET, FILM COATED ORAL
Refills: 0 | Status: DISCONTINUED | OUTPATIENT
Start: 2023-10-30 | End: 2023-11-02

## 2023-10-30 RX ORDER — BUMETANIDE 0.25 MG/ML
1 INJECTION INTRAMUSCULAR; INTRAVENOUS
Refills: 0 | DISCHARGE

## 2023-10-30 RX ORDER — GABAPENTIN 400 MG/1
1 CAPSULE ORAL
Refills: 0 | DISCHARGE

## 2023-10-30 RX ORDER — LIDOCAINE HCL 20 MG/ML
0.2 VIAL (ML) INJECTION ONCE
Refills: 0 | Status: DISCONTINUED | OUTPATIENT
Start: 2023-10-30 | End: 2023-10-30

## 2023-10-30 RX ORDER — SODIUM CHLORIDE 9 MG/ML
1000 INJECTION, SOLUTION INTRAVENOUS
Refills: 0 | Status: DISCONTINUED | OUTPATIENT
Start: 2023-10-30 | End: 2023-11-02

## 2023-10-30 RX ORDER — HYDROMORPHONE HYDROCHLORIDE 2 MG/ML
0.25 INJECTION INTRAMUSCULAR; INTRAVENOUS; SUBCUTANEOUS
Refills: 0 | Status: DISCONTINUED | OUTPATIENT
Start: 2023-10-30 | End: 2023-10-30

## 2023-10-30 RX ORDER — METFORMIN HYDROCHLORIDE 850 MG/1
500 TABLET ORAL DAILY
Refills: 0 | Status: DISCONTINUED | OUTPATIENT
Start: 2023-10-30 | End: 2023-11-02

## 2023-10-30 RX ORDER — DOXAZOSIN MESYLATE 4 MG
8 TABLET ORAL DAILY
Refills: 0 | Status: DISCONTINUED | OUTPATIENT
Start: 2023-10-30 | End: 2023-11-02

## 2023-10-30 RX ORDER — SODIUM CHLORIDE 9 MG/ML
3 INJECTION INTRAMUSCULAR; INTRAVENOUS; SUBCUTANEOUS EVERY 8 HOURS
Refills: 0 | Status: DISCONTINUED | OUTPATIENT
Start: 2023-10-30 | End: 2023-10-30

## 2023-10-30 RX ORDER — ACETAMINOPHEN 500 MG
1000 TABLET ORAL ONCE
Refills: 0 | Status: COMPLETED | OUTPATIENT
Start: 2023-10-30 | End: 2023-10-30

## 2023-10-30 RX ORDER — ISOSORBIDE MONONITRATE 60 MG/1
30 TABLET, EXTENDED RELEASE ORAL DAILY
Refills: 0 | Status: DISCONTINUED | OUTPATIENT
Start: 2023-10-30 | End: 2023-11-02

## 2023-10-30 RX ORDER — DEXTROSE 50 % IN WATER 50 %
15 SYRINGE (ML) INTRAVENOUS ONCE
Refills: 0 | Status: DISCONTINUED | OUTPATIENT
Start: 2023-10-30 | End: 2023-11-02

## 2023-10-30 RX ORDER — SODIUM CHLORIDE 9 MG/ML
500 INJECTION INTRAMUSCULAR; INTRAVENOUS; SUBCUTANEOUS ONCE
Refills: 0 | Status: COMPLETED | OUTPATIENT
Start: 2023-10-30 | End: 2023-10-30

## 2023-10-30 RX ORDER — CILOSTAZOL 100 MG/1
50 TABLET ORAL
Refills: 0 | Status: DISCONTINUED | OUTPATIENT
Start: 2023-10-30 | End: 2023-11-02

## 2023-10-30 RX ORDER — FLUTICASONE PROPIONATE 220 MCG
1 AEROSOL WITH ADAPTER (GRAM) INHALATION
Refills: 0 | DISCHARGE

## 2023-10-30 RX ORDER — CILOSTAZOL 100 MG/1
1 TABLET ORAL
Refills: 0 | DISCHARGE

## 2023-10-30 RX ORDER — INSULIN LISPRO 100/ML
VIAL (ML) SUBCUTANEOUS AT BEDTIME
Refills: 0 | Status: DISCONTINUED | OUTPATIENT
Start: 2023-10-30 | End: 2023-11-02

## 2023-10-30 RX ORDER — BUDESONIDE AND FORMOTEROL FUMARATE DIHYDRATE 160; 4.5 UG/1; UG/1
2 AEROSOL RESPIRATORY (INHALATION)
Refills: 0 | Status: DISCONTINUED | OUTPATIENT
Start: 2023-10-30 | End: 2023-11-02

## 2023-10-30 RX ORDER — SODIUM CHLORIDE 9 MG/ML
500 INJECTION INTRAMUSCULAR; INTRAVENOUS; SUBCUTANEOUS ONCE
Refills: 0 | Status: DISCONTINUED | OUTPATIENT
Start: 2023-10-30 | End: 2023-11-02

## 2023-10-30 RX ORDER — INSULIN LISPRO 100/ML
VIAL (ML) SUBCUTANEOUS
Refills: 0 | Status: DISCONTINUED | OUTPATIENT
Start: 2023-10-30 | End: 2023-11-02

## 2023-10-30 RX ORDER — DEXTROSE 50 % IN WATER 50 %
25 SYRINGE (ML) INTRAVENOUS ONCE
Refills: 0 | Status: DISCONTINUED | OUTPATIENT
Start: 2023-10-30 | End: 2023-11-02

## 2023-10-30 RX ORDER — CYCLOSPORINE 0.5 MG/ML
1 EMULSION OPHTHALMIC
Refills: 0 | DISCHARGE

## 2023-10-30 RX ORDER — GABAPENTIN 400 MG/1
2 CAPSULE ORAL
Refills: 0 | DISCHARGE

## 2023-10-30 RX ORDER — TRAMADOL HYDROCHLORIDE 50 MG/1
50 TABLET ORAL ONCE
Refills: 0 | Status: DISCONTINUED | OUTPATIENT
Start: 2023-10-30 | End: 2023-10-30

## 2023-10-30 RX ORDER — BUMETANIDE 0.25 MG/ML
1 INJECTION INTRAMUSCULAR; INTRAVENOUS DAILY
Refills: 0 | Status: DISCONTINUED | OUTPATIENT
Start: 2023-10-30 | End: 2023-11-02

## 2023-10-30 RX ORDER — METFORMIN HYDROCHLORIDE 850 MG/1
1 TABLET ORAL
Refills: 0 | DISCHARGE

## 2023-10-30 RX ORDER — DEXTROSE 50 % IN WATER 50 %
12.5 SYRINGE (ML) INTRAVENOUS ONCE
Refills: 0 | Status: DISCONTINUED | OUTPATIENT
Start: 2023-10-30 | End: 2023-11-02

## 2023-10-30 RX ORDER — TRAMADOL HYDROCHLORIDE 50 MG/1
50 TABLET ORAL EVERY 6 HOURS
Refills: 0 | Status: DISCONTINUED | OUTPATIENT
Start: 2023-10-30 | End: 2023-11-02

## 2023-10-30 RX ORDER — BISOPROLOL FUMARATE 10 MG/1
5 TABLET, FILM COATED ORAL DAILY
Refills: 0 | Status: DISCONTINUED | OUTPATIENT
Start: 2023-10-30 | End: 2023-11-02

## 2023-10-30 RX ORDER — ONDANSETRON 8 MG/1
4 TABLET, FILM COATED ORAL EVERY 6 HOURS
Refills: 0 | Status: DISCONTINUED | OUTPATIENT
Start: 2023-10-30 | End: 2023-11-02

## 2023-10-30 RX ORDER — PANTOPRAZOLE SODIUM 20 MG/1
40 TABLET, DELAYED RELEASE ORAL ONCE
Refills: 0 | Status: COMPLETED | OUTPATIENT
Start: 2023-10-30 | End: 2023-10-30

## 2023-10-30 RX ORDER — CEFAZOLIN SODIUM 1 G
2000 VIAL (EA) INJECTION ONCE
Refills: 0 | Status: COMPLETED | OUTPATIENT
Start: 2023-10-30 | End: 2023-10-30

## 2023-10-30 RX ORDER — AMLODIPINE BESYLATE 2.5 MG/1
1 TABLET ORAL
Refills: 0 | DISCHARGE

## 2023-10-30 RX ORDER — ALBUTEROL 90 UG/1
2 AEROSOL, METERED ORAL EVERY 6 HOURS
Refills: 0 | Status: DISCONTINUED | OUTPATIENT
Start: 2023-10-30 | End: 2023-11-02

## 2023-10-30 RX ORDER — ACETAMINOPHEN 500 MG
1000 TABLET ORAL EVERY 8 HOURS
Refills: 0 | Status: DISCONTINUED | OUTPATIENT
Start: 2023-10-31 | End: 2023-11-02

## 2023-10-30 RX ORDER — BISOPROLOL FUMARATE 10 MG/1
1 TABLET, FILM COATED ORAL
Refills: 0 | DISCHARGE

## 2023-10-30 RX ORDER — DOXAZOSIN MESYLATE 4 MG
1 TABLET ORAL
Refills: 0 | DISCHARGE

## 2023-10-30 RX ORDER — OXYCODONE HYDROCHLORIDE 5 MG/1
2.5 TABLET ORAL EVERY 4 HOURS
Refills: 0 | Status: DISCONTINUED | OUTPATIENT
Start: 2023-10-30 | End: 2023-11-02

## 2023-10-30 RX ORDER — POLYETHYLENE GLYCOL 3350 17 G/17G
17 POWDER, FOR SOLUTION ORAL AT BEDTIME
Refills: 0 | Status: DISCONTINUED | OUTPATIENT
Start: 2023-10-30 | End: 2023-11-02

## 2023-10-30 RX ORDER — CEFAZOLIN SODIUM 1 G
2000 VIAL (EA) INJECTION EVERY 8 HOURS
Refills: 0 | Status: COMPLETED | OUTPATIENT
Start: 2023-10-30 | End: 2023-10-31

## 2023-10-30 RX ORDER — ATORVASTATIN CALCIUM 80 MG/1
20 TABLET, FILM COATED ORAL AT BEDTIME
Refills: 0 | Status: DISCONTINUED | OUTPATIENT
Start: 2023-10-30 | End: 2023-11-02

## 2023-10-30 RX ORDER — ATORVASTATIN CALCIUM 80 MG/1
1 TABLET, FILM COATED ORAL
Qty: 0 | Refills: 0 | DISCHARGE

## 2023-10-30 RX ORDER — ALBUTEROL 90 UG/1
2 AEROSOL, METERED ORAL
Refills: 0 | DISCHARGE

## 2023-10-30 RX ORDER — ASPIRIN/CALCIUM CARB/MAGNESIUM 324 MG
325 TABLET ORAL
Refills: 0 | Status: DISCONTINUED | OUTPATIENT
Start: 2023-10-30 | End: 2023-11-02

## 2023-10-30 RX ORDER — METHOCARBAMOL 500 MG/1
2 TABLET, FILM COATED ORAL
Refills: 0 | DISCHARGE

## 2023-10-30 RX ORDER — SENNA PLUS 8.6 MG/1
2 TABLET ORAL AT BEDTIME
Refills: 0 | Status: DISCONTINUED | OUTPATIENT
Start: 2023-10-30 | End: 2023-11-02

## 2023-10-30 RX ORDER — NABUMETONE 750 MG
2 TABLET ORAL
Refills: 0 | DISCHARGE

## 2023-10-30 RX ORDER — FLUTICASONE FUROATE AND VILANTEROL TRIFENATATE 100; 25 UG/1; UG/1
1 POWDER RESPIRATORY (INHALATION)
Refills: 0 | DISCHARGE

## 2023-10-30 RX ORDER — GLUCAGON INJECTION, SOLUTION 0.5 MG/.1ML
1 INJECTION, SOLUTION SUBCUTANEOUS ONCE
Refills: 0 | Status: DISCONTINUED | OUTPATIENT
Start: 2023-10-30 | End: 2023-11-02

## 2023-10-30 RX ORDER — OXYCODONE HYDROCHLORIDE 5 MG/1
5 TABLET ORAL
Refills: 0 | Status: DISCONTINUED | OUTPATIENT
Start: 2023-10-30 | End: 2023-10-30

## 2023-10-30 RX ORDER — ACETAMINOPHEN 500 MG
1000 TABLET ORAL ONCE
Refills: 0 | Status: COMPLETED | OUTPATIENT
Start: 2023-10-31 | End: 2023-10-31

## 2023-10-30 RX ORDER — AMLODIPINE BESYLATE 2.5 MG/1
5 TABLET ORAL DAILY
Refills: 0 | Status: DISCONTINUED | OUTPATIENT
Start: 2023-10-30 | End: 2023-11-02

## 2023-10-30 RX ORDER — OXYCODONE HYDROCHLORIDE 5 MG/1
2.5 TABLET ORAL
Refills: 0 | Status: DISCONTINUED | OUTPATIENT
Start: 2023-10-30 | End: 2023-10-30

## 2023-10-30 RX ORDER — OXYCODONE HYDROCHLORIDE 5 MG/1
5 TABLET ORAL EVERY 4 HOURS
Refills: 0 | Status: DISCONTINUED | OUTPATIENT
Start: 2023-10-30 | End: 2023-11-02

## 2023-10-30 RX ORDER — PANTOPRAZOLE SODIUM 20 MG/1
40 TABLET, DELAYED RELEASE ORAL
Refills: 0 | Status: DISCONTINUED | OUTPATIENT
Start: 2023-10-30 | End: 2023-11-02

## 2023-10-30 RX ORDER — GABAPENTIN 400 MG/1
300 CAPSULE ORAL THREE TIMES A DAY
Refills: 0 | Status: DISCONTINUED | OUTPATIENT
Start: 2023-10-30 | End: 2023-11-02

## 2023-10-30 RX ADMIN — TRAMADOL HYDROCHLORIDE 50 MILLIGRAM(S): 50 TABLET ORAL at 22:29

## 2023-10-30 RX ADMIN — GABAPENTIN 300 MILLIGRAM(S): 400 CAPSULE ORAL at 22:23

## 2023-10-30 RX ADMIN — SODIUM CHLORIDE 500 MILLILITER(S): 9 INJECTION INTRAMUSCULAR; INTRAVENOUS; SUBCUTANEOUS at 18:20

## 2023-10-30 RX ADMIN — HYDROMORPHONE HYDROCHLORIDE 0.25 MILLIGRAM(S): 2 INJECTION INTRAMUSCULAR; INTRAVENOUS; SUBCUTANEOUS at 16:00

## 2023-10-30 RX ADMIN — Medication 325 MILLIGRAM(S): at 18:25

## 2023-10-30 RX ADMIN — PANTOPRAZOLE SODIUM 40 MILLIGRAM(S): 20 TABLET, DELAYED RELEASE ORAL at 10:34

## 2023-10-30 RX ADMIN — HYDROMORPHONE HYDROCHLORIDE 0.25 MILLIGRAM(S): 2 INJECTION INTRAMUSCULAR; INTRAVENOUS; SUBCUTANEOUS at 15:15

## 2023-10-30 RX ADMIN — Medication 1000 MILLIGRAM(S): at 20:44

## 2023-10-30 RX ADMIN — Medication 100 MILLIGRAM(S): at 20:13

## 2023-10-30 RX ADMIN — HYDROMORPHONE HYDROCHLORIDE 0.25 MILLIGRAM(S): 2 INJECTION INTRAMUSCULAR; INTRAVENOUS; SUBCUTANEOUS at 15:36

## 2023-10-30 RX ADMIN — BUDESONIDE AND FORMOTEROL FUMARATE DIHYDRATE 2 PUFF(S): 160; 4.5 AEROSOL RESPIRATORY (INHALATION) at 18:25

## 2023-10-30 RX ADMIN — TRAMADOL HYDROCHLORIDE 50 MILLIGRAM(S): 50 TABLET ORAL at 10:34

## 2023-10-30 RX ADMIN — TRAMADOL HYDROCHLORIDE 50 MILLIGRAM(S): 50 TABLET ORAL at 22:59

## 2023-10-30 RX ADMIN — SODIUM CHLORIDE 500 MILLILITER(S): 9 INJECTION INTRAMUSCULAR; INTRAVENOUS; SUBCUTANEOUS at 14:44

## 2023-10-30 RX ADMIN — CILOSTAZOL 50 MILLIGRAM(S): 100 TABLET ORAL at 20:13

## 2023-10-30 RX ADMIN — HYDROMORPHONE HYDROCHLORIDE 0.25 MILLIGRAM(S): 2 INJECTION INTRAMUSCULAR; INTRAVENOUS; SUBCUTANEOUS at 14:46

## 2023-10-30 RX ADMIN — Medication 400 MILLIGRAM(S): at 20:14

## 2023-10-30 NOTE — PHYSICAL THERAPY INITIAL EVALUATION ADULT - ADDITIONAL COMMENTS
Pt reports that she lives in a pvt apt with her dtr with no steps to negotiate. Pt states that she ambulates independently with a straight cane and rolling walker intermittently at home. Pt reports that she was independent with ADLs prior to admission with assist as needed from family. Pt owns a rolling walker, straight cane, tub transfer bed for DME.

## 2023-10-30 NOTE — PHYSICAL THERAPY INITIAL EVALUATION ADULT - SIT-TO-STAND BALANCE
Patient inquiring if she can start Rosuvastatin 10 mg daily as ordered by PCP. Will discuss with nephrology.  Lab results reviewed with patient.    good balance

## 2023-10-30 NOTE — PATIENT PROFILE ADULT - FALL HARM RISK - UNIVERSAL INTERVENTIONS
Bed in lowest position, wheels locked, appropriate side rails in place/Call bell, personal items and telephone in reach/Instruct patient to call for assistance before getting out of bed or chair/Non-slip footwear when patient is out of bed/[place] to call system/Physically safe environment - no spills, clutter or unnecessary equipment/Purposeful Proactive Rounding/Room/bathroom lighting operational, light cord in reach

## 2023-10-30 NOTE — DISCHARGE NOTE PROVIDER - HOSPITAL COURSE
History of Present Illness:   This is a 77 y/o female PMH, HLD, DM2, hypertension, asthma, RAMOS on CPAP, PVD, plan for vascular procedure 10/20/23, osteoarthritis, c/o right knee pain for many years, s/p R TKA in 2021.  Plan for left total knee replacement on 10/30/23 with Dr. Dutton.     Goals of Care: "I want to be able to walk better than I am"    PAST MEDICAL HISTORY:  Asthma   Heart murmur   Hypertension   Lumbar herniated disc   Mild HTN   RAMOS on CPAP    Osteoarthritis   Type 2 diabetes mellitus.     PAST SURGICAL HISTORY:  Finger amputation, traumatic   H/O total knee replacement, right   S/P carpal tunnel release.    Hospital Course:  After admission on 10/30 and receiving pre-operative parenteral prophylactic antibiotics, the patient underwent an uncomplicated Left total knee replacement with KERI robotic assist with Dr. Dutton. Patient tolerated the procedure well and was transferred to the recovery room in stable condition, with a stable neuro/vascular exam of the operated extremity.    Patient was placed on Aspirin 325 mg for DVT ppx, and was placed on Protonix 40 mg for GI protection.   Patient was made weight bearing as tolerated with the operative leg.     Typical Physical & occupational therapy modalities post surgery were performed by physical and occupational therapies, including ambulation training, range of motion, ADL's, abd transfers.  After progression of mobility guided by the PT/ OT staff,  the patient was felt to benefit from further rehabilitative care for restoration to level of function. This was felt to best be accomplished at [id number].  Discharge and Orthopedic Care instructions were delineated in the Discharge Plan and reviewed with the patient. All medications were delineated in the medication reconciliation tool and key points were reviewed with the patient. They were deemed stable from an Orthopedic & medical standpoint for discharge ***   History of Present Illness:   This is a 77 y/o female PMH, HLD, DM2, hypertension, asthma, RAMOS on CPAP, PVD, plan for vascular procedure 10/20/23, osteoarthritis, c/o right knee pain for many years, s/p R TKA in 2021.  Plan for left total knee replacement on 10/30/23 with Dr. Dutton.     Goals of Care: "I want to be able to walk better than I am"    PAST MEDICAL HISTORY:  Asthma   Heart murmur   Hypertension   Lumbar herniated disc   Mild HTN   RAMOS on CPAP    Osteoarthritis   Type 2 diabetes mellitus.     PAST SURGICAL HISTORY:  Finger amputation, traumatic   H/O total knee replacement, right   S/P carpal tunnel release.    Hospital Course:  After admission on 10/30 and receiving pre-operative parenteral prophylactic antibiotics, the patient underwent an uncomplicated Left total knee replacement with KERI robotic assist with Dr. Dutton.   Patient tolerated the procedure well and was transferred to the recovery room in stable condition, with a stable neuro/vascular exam of the operated extremity.    Patient was placed on Aspirin 325 mg for DVT ppx, and was placed on Protonix 40 mg for GI protection.   Patient was made weight bearing as tolerated with the operative leg.     Typical Physical & occupational therapy modalities post surgery were performed by physical and occupational therapies, including ambulation training, range of motion, ADL's, abd transfers.  After progression of mobility guided by the PT/ OT staff,  the patient was felt to benefit from further rehabilitative care for restoration to level of function. This was felt to best be accomplished at home.  Discharge and Orthopedic Care instructions were delineated in the Discharge Plan and reviewed with the patient.   All medications were delineated in the medication reconciliation tool and key points were reviewed with the patient.   They were deemed stable from an Orthopedic & medical standpoint for discharge 10/31.   History of Present Illness:   This is a 79 y/o female PMH, HLD, DM2, hypertension, asthma, RAMOS on CPAP, PVD, plan for vascular procedure 10/20/23, osteoarthritis, c/o right knee pain for many years, s/p R TKA in 2021.  Plan for left total knee replacement on 10/30/23 with Dr. Dutton.     Goals of Care: "I want to be able to walk better than I am"    PAST MEDICAL HISTORY:  Asthma   Heart murmur   Hypertension   Lumbar herniated disc   Mild HTN   RAMOS on CPAP    Osteoarthritis   Type 2 diabetes mellitus.     PAST SURGICAL HISTORY:  Finger amputation, traumatic   H/O total knee replacement, right   S/P carpal tunnel release.    Hospital Course:  After admission on 10/30 and receiving pre-operative parenteral prophylactic antibiotics, the patient underwent an uncomplicated Left total knee replacement with KERI robotic assist with Dr. Dutton.   Patient tolerated the procedure well and was transferred to the recovery room in stable condition, with a stable neuro/vascular exam of the operated extremity.    Patient was placed on Ecotrin(Aspirin) 325 mg for DVT ppx, and was placed on Protonix 40 mg for GI protection.   Patient was made weight bearing as tolerated with the operative leg.     Typical Physical & occupational therapy modalities post surgery were performed by physical and occupational therapies, including ambulation training, range of motion, ADL's, abd transfers.  After progression of mobility guided by the PT/ OT staff,  the patient was felt to benefit from further rehabilitative care for restoration to level of function. This was felt to best be accomplished at home with home PT. .  Discharge and Orthopedic Care instructions were delineated in the Discharge Plan and reviewed with the patient.   All medications were delineated in the medication reconciliation tool and key points were reviewed with the patient.   They were deemed stable from an Orthopedic & medical standpoint for discharge 10/31.   History of Present Illness:   This is a 77 y/o female PMH, HLD, DM2, hypertension, asthma, RAMOS on CPAP, PVD, plan for vascular procedure 10/20/23, osteoarthritis, c/o right knee pain for many years, s/p R TKA in 2021.  Plan for left total knee replacement on 10/30/23 with Dr. Dutton.     Goals of Care: "I want to be able to walk better than I am"    PAST MEDICAL HISTORY:  Asthma   Heart murmur   Hypertension   Lumbar herniated disc   Mild HTN   RAMOS on CPAP    Osteoarthritis   Type 2 diabetes mellitus.     PAST SURGICAL HISTORY:  Finger amputation, traumatic   H/O total knee replacement, right   S/P carpal tunnel release.    Hospital Course:  After admission on 10/30 and receiving pre-operative parenteral prophylactic antibiotics, the patient underwent an uncomplicated Left total knee replacement with KERI robotic assist with Dr. Dutton.   Patient tolerated the procedure well and was transferred to the recovery room in stable condition, with a stable neuro/vascular exam of the operated extremity.    Patient was placed on Ecotrin(Aspirin) 325 mg for DVT ppx, and was placed on Protonix 40 mg for GI protection.   Patient was made weight bearing as tolerated with the operative leg.     Typical Physical & occupational therapy modalities post surgery were performed by physical and occupational therapies, including ambulation training, range of motion, ADL's, abd transfers.  After progression of mobility guided by the PT/ OT staff,  the patient was felt to benefit from further rehabilitative care for restoration to level of function. This was felt to best be accomplished at home with home PT.   Discharge and Orthopedic Care instructions were delineated in the Discharge Plan and reviewed with the patient.   All medications were delineated in the medication reconciliation tool and key points were reviewed with the patient.

## 2023-10-30 NOTE — BRIEF OPERATIVE NOTE - NSICDXBRIEFPROCEDURE_GEN_ALL_CORE_FT
PROCEDURES:  Left total knee arthroplasty 30-Oct-2023 13:53:26  Trice Wood  
I will SWITCH the dose or number of times a day I take the medications listed below when I get home from the hospital:  None

## 2023-10-30 NOTE — DISCHARGE NOTE PROVIDER - CARE PROVIDER_API CALL
Quentin Dutton  Orthopaedic Surgery  [address], [address]  [place] [address]  Phone: [phone]  Fax: [phone]  Established Patient  Follow Up Time:    Quentin Dutton  Orthopaedic Surgery  [address], [address]  [place] [address]  Phone: [phone]  Fax: [phone]  Established Patient  Scheduled Appointment: 11/14/2023

## 2023-10-30 NOTE — CHART NOTE - NSCHARTNOTEFT_GEN_A_CORE
Patient seen and evaluated in the recovery unit. Resting without complaints at this time. Pt states pain is well tolerated after having received IV Dilaudid. No Chest Pain, SOB, N/V/D/HA.    T(C): 36.3 (10-30-23 @ 16:00), Max: 36.8 (10-30-23 @ 10:11)  HR: 64 (10-30-23 @ 16:15) (56 - 87)  BP: 148/70 (10-30-23 @ 16:15) (120/57 - 163/67)  RR: 18 (10-30-23 @ 16:15) (16 - 18)  SpO2: 99% (10-30-23 @ 16:15) (93% - 99%)  Wt(kg): --    Exam:  Alert and Oriented, No Acute Distress.   Card: +S1/S2, RRR  Pulm: CTAB  Laterality: L lower extremity   Aquacel dressing on L Knee clean, dry and intact.   Sensory: Sensation intact to light touch   Motor: 5/5 (+) PF/DF/EHL/FHL  Calves soft, non-tender   2+ DP/PT pulse  Lower extremities warm and well-perfused, cap refill < 3 sec.     Xray:   IMPRESSION: s/p L total knee arthroplasty   < from: Xray Knee 1 or 2 Views, Left (10.30.23 @ 14:07) >  Unconstrained left total knee prosthesis implanted.  Intact and aligned hardware and no periprosthetic fractures.  Postoperative soft tissue changes.  Surgical skin staples overlie operative site.  Correlate with intraoperative findings.    < end of copied text >    Labs:    A/P: 78yFemale S/p L Total Knee Arthroplasty. VSS. NAD.  -PT/OT- WBAT on LLE/OOB   -IS bedside   -DVT PPx: Aspirin 325 mg BID, Cilostazol 50 mg BID, SCD, Early OOB and Amb  -GI PPx: Protonix 40 mg   -Pain Control  -Continue Current Tx  -f/u AM labs.   -Dispo planning: PACU to Floor, pending PT/OT eval.     Demond Farris PA-C  Orthopedic Surgery Team  Team Pager #[phone]

## 2023-10-30 NOTE — DISCHARGE NOTE PROVIDER - NSDCFUADDINST_GEN_ALL_CORE_FT
Please follow up with Dr. Dutton at your pre-scheduled post-operative follow up appointment. (Call to confirm)  Dressing care:   -Keep dressing clean, dry, and intact. Do not remove bandage until post-operative appointment.  Continue to ambulate as tolerated to the LEFT leg.     Pain medications:   Standing:         -Acetaminophen 325mg - 3 tabs every 8 hours    As needed:        -Tramadol 50mg - 1 tab every 8-12 hours - Take only if needed for MODERATE pain       -oxycodone 5mg - 1 tab every 4-6 hours - Take only if needed for SEVERE or BREAKTHROUGH pain    Other Medications: (Standing)  -Aspirin (Enteric Coated) 325mg every 12 hours - to prevent blood clots (for 6 weeks post operatively.)  -Protonix 40mg - 1 tab every 24 hours - to prevent stomach irritation/ulcers  -Senna 8.6mg - 2 pills every 24 hours - stool softener  -colace 100mg - 1 pill every 8 hours - stool softener.     Recommended to follow up with your primary care provider within 1-2 months of hospital discharge to discuss your recent surgery and any change to your medications.  Please call Dr. Dutton' s office within next few days to schedule a follow up appointment about 14 days after surgery.   Recommend follow up with medical MD, within next 4 weeks.   Dressing will be changed during office visit.  Patient may shower, limit direct water to dressing, if wet pat dry.   Out of bed, ambulate, weight bearing as tolerated-   Physical therapy to assist with exercise and help increase endurance.   Please contact Doctors office regarding arrangements for out patient Physical therapy.    Please call Dr. Dutton' s office within next few days to schedule a follow up appointment about 14 days after surgery.   Recommend follow up with medical MD, within next 4 weeks.   Dressing will be changed during office visit.  Patient may shower, limit direct water to dressing, if wet pat dry.   Out of bed, ambulate, weight bearing as tolerated-   Continue on Ecotrin (Aspirin) 325mg 1 tab twice daily x 6 weeks for blood clot prevention  Physical therapy to assist with exercise and help increase endurance.   Please contact Doctors office regarding arrangements for out patient Physical therapy.    Please call Dr. Dutton' s office to confirm appointment on 11/14/23.  Recommend follow up with medical MD, within next 4 weeks.   Dressing will be changed during office visit.  Patient may shower, limit direct water to dressing, if wet pat dry.   Out of bed, ambulate, weight bearing as tolerated on left leg.  Continue on Ecotrin (Aspirin) 325mg 1 tab twice daily x 6 weeks for blood clot prevention.  Physical therapy to assist with exercise and help increase endurance.   Please contact Doctors office regarding arrangements for out patient Physical therapy.

## 2023-10-30 NOTE — DISCHARGE NOTE PROVIDER - NSDCFUSCHEDAPPT_GEN_ALL_CORE_FT
[facility]  ORTHOSURG [address] [facility]  Scheduled Appointment: 11/14/2023

## 2023-10-30 NOTE — PATIENT PROFILE ADULT - NSPROGENBLOODRESTRICT_GEN_A_NUR
Your Child's Health  [age]-Year-Old Visit      NUTRITION: At this age children should be sitting at the table for meals and eating the same as the rest of the family. Just be sure to continue to avoid hard, chunk or chewy foods that could cause choking. Offer a couple of snacks daily in addition to meals since toddlers do not generally eat large portions at a time. Your child should be getting ~16 to 20 ounces of low fat or skim milk daily. A multivitamin with iron is recommended primarily for the iron and vitamin D. Continue to expect your child to like or prefer a limited number of foods, but keep offering a variety, even foods they have already turned down. Don't get upset when they refuse something--just try it again at a later date.     Keep in mind that overweight and obesity are serious problems in our country. As long as you are in charge of what your child is eating, keep it healthy! Avoid starting unhealthy eating habits like sweets, fried fast food, and sweet drinks like soda, juice and Jose Juan-Aid. Sweet drinks are a huge source of unhealthy sugar and calories. Whole fruit is a much healthier choice than juice. If you give your child juice, limit it to no more than 4 ounces a day of 100% juice. Do not water it down in a cup that they can carry around and drink from over an extended period of time; this frequent exposure to the sugars in juice (even if diluted with water) just increases their risk of tooth decay.    Remember, it is always healthier to eat fruit than to drink it!    DENTAL: You should help your child brush their teeth at least twice a day; bedtime brushing is particularly important to their dental health. Use a very small amount of regular (fluoride) toothpaste. Using city (tap) water to drink and cook with provides important fluoride for strengthening teeth to protect against cavities. If you have well water instead of a city water supply, however, you should have it tested for fluoride  content to determine whether your child should receive a fluoride supplement.    DEVELOPMENT & BEHAVIOR: A 3 year old child likes to imitate what you are doing, play simple make-believe games, start to wash and dry their hands and do some dressing and undressing. Your child has likely been learning new words rapidly over the last few months; by age 3, most have at least 50 words and are putting two words together in short sentences. They hopefully have a favorite book (that they can complete the sentences in!) and will name pictures if you point to them. They are more and more active and may seem clumsy sometimes because they are eager to do everything fast. (This is normal!)    Reading books together continues to be one of the best things you can do for your child. Not only will it improve their language skills, but it is a fact that children who love reading and books do better in school than children who were not read to early in life.      Help your child's independence grow by letting them explore new settings and situations (as long as they are safe!). Encourage their attempts at simple tasks and help them learn how to express their feelings like anger, happiness and frustration.     It remains very important to be consistent as you are teaching your child rules and “do’s\" and \"do not's”. When all the family members react to the child's behaviors in the same way, the child will soon learn which behaviors are more likely to earn them praise and smiles (positive attention). When your child misbehaves, say “no” and provide a simple explanation and redirect them to something else. Don’t yell or give a long lecture--remember that your attention is what your child wants most from you, regardless of whether it is positive or negative. Short time-outs (no more than 2 minutes) at this age can also be effective-- just remember, again, to be consistent (in how you do it and the reasons you do it for).       TELEVISION:  The American Academy of Pediatrics recommends no more than one hour a day of television (or any screen time--videos, pads, phones, computers) at this age. As the parent, you should choose high quality, educational programs, and you should watch with your child to help them understand what they are viewing. Avoid letting your child watch programs that adults in the home are watching that have frightening or adult content because this can influence children's behavior and sleep. Also know that young children can be strongly influenced by commercials on TV and will likely start asking for the toys and junk food they see advertised (which is another reason to really limit how much TV they watch!).     TOILET TRAINING:  Signs that your child may be ready for toilet training include: recognizing the urge to go, understanding what that urge means or what they need to do to relieve it, using words to express that they need to go and actually being able to physically get to the toilet and and use it. If you feel your child is ready, encourage their use of a potty chair and give lots of praise when they use it. (If they don't seem interested, don't push it-- wait until they indicate an interest.) Small rewards, like using a sticker or star chart, for successful use of the potty are often effective. Like in so many other aspects of your young child's life, consistency in potty training is important. Go through the same routine each time they use the potty (including hand washing afterwards); children learn more quickly when they learn to predict what comes next.   Remember that most children are not physically ready to be fully potty trained until around age 3 years, so be sure to avoid negative  remarks or punishments when accidents happen-- their little bodies are still developing the control they need to be fully potty trained! Helpful information about potty training can be found on the website of the  American Academy of  Pediatrics:HealthyChildren.org - From the American Academy of Pediatrics (search for \"toilet training\").    SAFETY:  1. CAR SAFETY: An approved car seat in the back seat is required by [place] law. Your child is safest in a rear-facing convertible car seat until they reach the top height or weight limit allowed by the car seat . (Some car seats will not specify a height limit, but they recommend that there be one inch between the top of the child’s head and the top of the car seat.) Once they outgrow the size limits for the rear-facing position, turn the seat around and use it until your child reaches the size limits for the forward facing position. (It is very important your child remain in a car seat with the 5-point harness--with straps over both shoulder and both hips--at this age. They are too young for a “booster” seat.)  2. HOME & KITCHEN SAFETY: By now you realize that your busy toddler wants to get into everything! Keep a close eye out at home to make sure medications, toxic substances (cigarettes, alcohol, lighters, cleaning & chemical items) remain safely stored (out of sight, up high and/or locked). Keep knives and other sharp items and anything hot out of reach--curious toddlers will reach for anything interesting that they see! If there is a gun or firearm in the home, make sure it is stored unloaded in a secure locked location separate from the ammunition.   Make sure your smoke and carbon monoxide alarms work and that you have a family fire escape plan. Also, is this number in your cell phone? Call the Poison Center at 1-712.291.3031 for any known or suspected poisoning.  3. OUTDOOR SAFETY: Careful supervision is very important both indoors and out. Toddlers may be able to open doors, so be extra careful to make sure they don’t get outside without you knowing. When outside they need to be carefully watched near streets and anywhere that cars might be backing up--small children are very  difficult to see in rear-view mirrors. Your child should be kept away from moving machinery, including lawn mowers, and ideally kept out of spaces (garages, sheds) where any sharp equipment or toxic chemical products are stored. If your child starts riding a tricycle, have them wear a helmet. (And make sure the whole family is wearing helmets!)  4. PETS: Toddlers need to be carefully watched around pets. Dog bites are common in this age group and are most likely to occur when an unsupervised child is either left alone with a dog or wanders off and approaches a dog--even (especially!) one they know.  (Most dog bites in this age group are from pets of the family or friends.)   5. SUN & WATER SAFETY: Toddlers may love water but they still need to be very carefully watched around it (this means tubs, buckets, wading pools and toilets as well as pools and lakes!).Children can drown in just a couple inches of water, so never leave an infant or toddler alone in a tub. Also, do not rely on older children to properly supervise the younger ones. An adult should always be within arm’s reach whenever a young child is in or around water. Your child is too young for swimming lessons; toddler swim programs, life jackets or “swimmies” will NOT protect your child from drowning! Constant supervision by an adult who knows how to swim is critical. Permanent pools (in ground and above ground) should be fenced off (and locked), and wading pools should be drained when not in use. Keep large buckets empty and keep toilet seat lids down and secured with a safety lock if possible.    SMOKING:  Continue to protect your child from cigarette smoke. Exposure to smoke will increase their risk of asthma, ear infections, and respiratory infections (pneumonia). If you smoke and are ready to consider quitting, talk to your doctor. Nicotine replacement products can be very helpful in breaking this tough addiction.      LEAD EXPOSURE: The [place]  [facility] recommends screening children three times in the first 3 years of life (age 2yo, 3yo and 2yo). If you do not live in [place], talk to your doctor about lead screening if any of the following apply: (1) your child currently (or had previously) lives in or frequently visits a house or building built before 1950 (including , grandparent homes, etc); (2) your child currently (or had previously) lives in or frequently visits a house or building built before 1978 with recent or ongoing renovations; (3) your child has a brother, sister or playmate who has had lead poisoning; (4) your child is enrolled in Medicaid or WIC. Very rarely, other sources of lead exposure can include herbal remedies or imported items (mini blinds, canned goods). Do an internet search for “[facility] Department Lead” for helpful information about lead risks in [place]. If you have questions about older neighborhoods in [place] that might have lead connecting (or service) pipes, do an internet search for \"[place] lead awareness and drinking water safety\". From this web page, you can search for your address to find out if your home was built with lead service lines. There are also helpful hints regarding water safety on this site.      MEDICATION FOR FEVER OR PAIN:   Children’s acetaminophen liquid (e.g,Tylenol or Tempra or a store brand) comes as a 160 MG/5 mL suspension. (Chewables and “melt-aways” are not recommended at this age because they can cause choking.) It may be given every four to six hours as needed for pain or fever. (These medicines are ONLY for fever and pain--they do not treat cold symptoms or cough or other symptoms.)    Child’s Weight: Dose:  24 - 35 pounds:   160 mg (5.0 mL which is the same as 1 teaspoon)    Infant / Children's ibuprofen (e.g., Advil or Motrin or a store brand) may be given every six to eight hours if needed for pain or fever. (Again, ONLY pain and fever--they do not  treat other symptoms!) At this age your child should have the oral suspension (100mg/5ml)-- your child could choke on the “chewable” products.     Child’s Weight: Dose:       24 - 35 pounds:   100 mg (5.0 ml which is the same as 1 teaspoon):          If Eber is outside these weight ranges, call the office for advice on dosing.  Most Recent Immunizations   Administered Date(s) Administered   • DTaP (INFANRIX)(DAPTACEL,INFANRIX) 03/09/2018   • DTaP/HIB/IPV 06/16/2017   • Hep A, ped/adol, 2 dose 07/19/2018   • Hep B, adolescent or pediatric 09/15/2017   • Hib (PRP-T) 12/15/2017   • Influenza, injectable, quadrivalent, preservative-free 10/26/2018   • MMR 12/15/2017   • Pneumococcal Conjugate 13 valent 12/15/2017   • Rotavirus - pentavalent 06/16/2017   • Varicella 12/15/2017       If your child develops any of the following reactions within 72 hours after an immunization, notify your pediatrician by calling the pediatric phone nurse:  1.  A temperature of 105 degrees or above.  2.  More than 3 hours of continuous crying.  3.  A shrill, high-pitched cry.  4.  A pale, limp spell.  5.  A seizure or fainting spell.  In this case, you should call 911 or go immediately to the emergency room.      Thank you for entrusting your care to [facility]!    Also, check out “Children’s Health” on the [facility] Blog for updates on timely topics regarding children’s health!      We did a referral for Birth to Three today. If you do not hear from them in 3-4 weeks, let us know. Or you can call the [place] Birth to Three department yourself to as about the status of the referral ([phone] or [phone]).      Taking Care of Eber's Eczema    Eczema is very dry skin. It can cause severe itching and sores on the skin.      Eczema can be treated but not cured. Hopefully Eber will outgrow it someday, but until that happens, it is important to treat it properly every day.      If you do not add moisture  to your child’s skin every day, expect the eczema to get worse.     For Eber's everyday skin care:      Use a generous amount of a good moisturizer 2 or 3 (or more!) times a day.    If your doctor has prescribed a medicated cream (hc), apply that cream to the eczema patches as directed (usually twice a day).    Try to apply the moisturizer and the medicated cream at different times. If you cannot do that, apply the medicated creams to the roughest patches of skin (where the eczema is the worst) and apply the  moisturizers around  those patches. By doing this, the medicated cream will not be diluted (or thinned out) by the moisturizers.    Whenever possible, put the ointments and moisturizers on Eber’s skin when they are still damp from a bath.    When Eber’s skin is clear, keep using a moisturizer everyday. It is important and healthy for children with eczema to have a break from the medicated creams (steroids) sometimes, but their skin still needs added moisture.     Good moisturizers you can buy yourself are petroleum jelly (the Vaseline brand OR a store brand) , CeraVe cream®, Eucerin Creme®, Eucerin Plus Creme®, Aquaphor ointment®, Vanicream  Aveeno Eczema Care®,  NutraDerm cream®, Moisturel creme®, Lubriderm GelCreme®, Piper cream® or Cetaphil cream®.      Use creams or ointments only. They come in a tub because they are thick. Baby oil and lotions are NOT good moisturizers. (If they are thin and watery enough to be pumped or poured out of a bottle, they are not great moisturizers.) Choose the thicker ointments and creams that come in a tub or tube.      As the skin gets better, cut down on the use of the stronger steroid cream. Go back to using the everyday creams.     Try to limit the use of the stronger steroid ointments to 1 to 2  weeks at a time.     If the eczema gets worse again, you can go back to using the stronger ointments.     Remember, try to only use the stronger steroid ointments for  a short period of time and make sure you moisturize EVERYDAY.     Scratching can make the rash worse.  Sometimes a medicine for itching will help Eber.         Other things that can help Eber’s eczema:    Keep Eber’s fingernails short.    Avoid hot water in baths and keep baths to less than 15 minutes.    Avoid Ivory® and deodorant soaps (Dial®, Safeguard®, Sami Spring®, Lever 2000®).     Avoid bubble baths.    Good soaps and cleansers to use are Dove® Unscented Sensitive Skin bar®, Basis bar®, Olay bar®, Eucerin Bar®, Aquaphor Fragrance Free Baby Cleanser®, CeraVe Hydrating cleanser®, Cetaphil Gentle Cleanser or Bar®, Vanicream Cleansing Bar®, and Moisturel Sensitive Skin Cleanser®. Free & Clear Shampoo is a good shampoo for sensitive scalps.    Only buy skin products labeled “FRAGRANCE FREE”.     Use All Free & Clear®, Cheer Free and Gentle®, Arm & Hammer Perfume and Dye Free Liquid ® or Tide Free® laundry detergents.  (Perfumes and detergents in Dreft® and Ivory Snow® may worsen eczema.)       none

## 2023-10-30 NOTE — DISCHARGE NOTE PROVIDER - NSDCMRMEDTOKEN_GEN_ALL_CORE_FT
acetaminophen 325 mg oral tablet: 3 tabs orally every 6 hours X 5 days, then as needed for pain  amLODIPine 5 mg oral tablet: 1 tab(s) orally once a day  atorvastatin 20 mg oral tablet: 1 tab(s) orally once a day  bisoprolol 5 mg oral tablet: 1 tab(s) orally once a day  Breo Ellipta 100 mcg-25 mcg/inh inhalation powder: 1 puff(s) inhaled once a day  bumetanide 1 mg oral tablet: 1 tab(s) orally once a day  cilostazol 50 mg oral tablet: 1 tab(s) orally 2 times a day  doxazosin 8 mg oral tablet: 1 tab(s) orally once a day  fluticasone 50 mcg/inh inhalation powder: 1 puff(s) inhaled once a day  gabapentin 300 mg oral capsule: 1 cap(s) orally once a day  gabapentin 300 mg oral tablet: 2 cap(s) orally once a day (at bedtime)  Imdur 30 mg oral tablet, extended release: 1 tab(s) orally once a day  metFORMIN 500 mg oral tablet: 1 tab(s) orally once a day  methocarbamol 750 mg oral tablet: 2 tab(s) orally once a day  nabumetone 500 mg oral tablet: 2 tab(s) orally 2 times a day  Restasis 0.05% ophthalmic emulsion: 1 drop(s) in each affected eye every 12 hours  Ventolin HFA 90 mcg/inh inhalation aerosol: 2 puff(s) inhaled every 6 hours as needed for  bronchospasm   acetaminophen 325 mg oral tablet: 3 tabs orally every 6 hours X 5 days, then as needed for pain  acetaminophen 500 mg oral tablet: 2 tab(s) orally every 8 hours Continue for 7 days then as needed  amLODIPine 5 mg oral tablet: 1 tab(s) orally once a day  aspirin 325 mg oral delayed release tablet: 1 tab(s) orally 2 times a day  atorvastatin 20 mg oral tablet: 1 tab(s) orally once a day  bisoprolol 5 mg oral tablet: 1 tab(s) orally once a day  Breo Ellipta 100 mcg-25 mcg/inh inhalation powder: 1 puff(s) inhaled once a day  bumetanide 1 mg oral tablet: 1 tab(s) orally once a day  cilostazol 50 mg oral tablet: 1 tab(s) orally 2 times a day  doxazosin 8 mg oral tablet: 1 tab(s) orally once a day  fluticasone 50 mcg/inh inhalation powder: 1 puff(s) inhaled once a day  gabapentin 300 mg oral capsule: 1 cap(s) orally once a day  gabapentin 300 mg oral tablet: 2 cap(s) orally once a day (at bedtime)  Imdur 30 mg oral tablet, extended release: 1 tab(s) orally once a day  metFORMIN 500 mg oral tablet: 1 tab(s) orally once a day  methocarbamol 750 mg oral tablet: 2 tab(s) orally once a day  nabumetone 500 mg oral tablet: 2 tab(s) orally 2 times a day  Narcan 4 mg/0.1 mL nasal spray: 1 spray(s) intranasally every 2 minutes Alternate nostrils  oxyCODONE 5 mg oral tablet: 1 tab(s) orally every 4 hours as needed for  mild pain (4-6) Or 2 tabs orally every 4 hours as needed for severe pain(7-10)  pantoprazole 40 mg oral delayed release tablet: 1 tab(s) orally once a day (before a meal)  polyethylene glycol 3350 oral powder for reconstitution: 17 gram(s) orally once a day (at bedtime)  Restasis 0.05% ophthalmic emulsion: 1 drop(s) in each affected eye every 12 hours  senna leaf extract oral tablet: 2 tab(s) orally once a day (at bedtime)  traMADol 50 mg oral tablet: 1 tab(s) orally every 6 hours As needed Mild Pain (1 - 3)  Ventolin HFA 90 mcg/inh inhalation aerosol: 2 puff(s) inhaled every 6 hours as needed for  bronchospasm   acetaminophen 500 mg oral tablet: 2 tab(s) orally every 8 hours Continue for 7 days then as needed  amLODIPine 5 mg oral tablet: 1 tab(s) orally once a day  atorvastatin 20 mg oral tablet: 1 tab(s) orally once a day  bisoprolol 5 mg oral tablet: 1 tab(s) orally once a day  Breo Ellipta 100 mcg-25 mcg/inh inhalation powder: 1 puff(s) inhaled once a day  bumetanide 1 mg oral tablet: 1 tab(s) orally once a day  cilostazol 50 mg oral tablet: 1 tab(s) orally 2 times a day  doxazosin 8 mg oral tablet: 1 tab(s) orally once a day  Ecotrin 325 mg oral delayed release tablet: 1 tab(s) orally 2 times a day x 6 weeks post op for DVt ppx  fluticasone 50 mcg/inh inhalation powder: 1 puff(s) inhaled once a day  gabapentin 300 mg oral capsule: 1 cap(s) orally once a day  gabapentin 300 mg oral tablet: 2 cap(s) orally once a day (at bedtime)  Imdur 30 mg oral tablet, extended release: 1 tab(s) orally once a day  metFORMIN 500 mg oral tablet: 1 tab(s) orally once a day  methocarbamol 750 mg oral tablet: 2 tab(s) orally once a day  nabumetone 500 mg oral tablet: 2 tab(s) orally 2 times a day  Narcan 4 mg/0.1 mL nasal spray: 4 milligram(s) intranasally every 2 to 3 minutes alternating nostrils as needed for overdose  oxyCODONE 5 mg oral tablet: 1 tab(s) orally every 4 hours as needed for  mild pain (4-6) Or 2 tabs orally every 4 hours as needed for severe pain(7-10)  oxyCODONE 5 mg oral tablet: 1 tab(s) orally every 4 hours as needed for  severe pain MDD: 006  pantoprazole 40 mg oral delayed release tablet: 1 tab(s) orally once a day (before a meal)  polyethylene glycol 3350 oral powder for reconstitution: 17 gram(s) orally once a day (at bedtime) (available over the counter)  Restasis 0.05% ophthalmic emulsion: 1 drop(s) in each affected eye every 12 hours  senna leaf extract oral tablet: 2 tab(s) orally once a day (at bedtime) as needed for  constipation  traMADol 50 mg oral tablet: 1 tab(s) orally every 6 hours as needed for  moderate pain MDD: 004  Ventolin HFA 90 mcg/inh inhalation aerosol: 2 puff(s) inhaled every 6 hours as needed for  bronchospasm

## 2023-10-30 NOTE — DISCHARGE NOTE PROVIDER - PROVIDER TOKENS
PROVIDER:[TOKEN:[[id number]],ESTABLISHEDPATIENT:[T]] PROVIDER:[TOKEN:[[id number]],SCHEDULEDAPPT:[11/14/2023],ESTABLISHEDPATIENT:[T]]

## 2023-10-30 NOTE — PHYSICAL THERAPY INITIAL EVALUATION ADULT - PERTINENT HX OF CURRENT PROBLEM, REHAB EVAL
This is a 79 y/o female PMH, HLD, DM2, hypertension, asthma, RAMOS on CPAP, PVD, plan for vascular procedure 10/20/23, osteoarthritis, c/o right knee pain for many years, s/p R TKA in 2021.  Plan for left total knee replacement on 10/30/23 with Dr. Dutton.

## 2023-10-30 NOTE — PRE-ANESTHESIA EVALUATION ADULT - NSANTHPMHFT_GEN_ALL_CORE
chart and consultant notes reviewed. no hx sig cv dz - states et ~ 1 flight, no cp/sob. recent tte essentially unremarkable. morbid obesity. katina, uses cpap. dm, a1c~ 6.5, current fs < 200. mild asthma, at clinical baseline

## 2023-10-31 LAB
ANION GAP SERPL CALC-SCNC: 8 MMOL/L — SIGNIFICANT CHANGE UP (ref 5–17)
ANION GAP SERPL CALC-SCNC: 8 MMOL/L — SIGNIFICANT CHANGE UP (ref 5–17)
BUN SERPL-MCNC: 16 MG/DL — SIGNIFICANT CHANGE UP (ref 7–23)
BUN SERPL-MCNC: 16 MG/DL — SIGNIFICANT CHANGE UP (ref 7–23)
CALCIUM SERPL-MCNC: 10.3 MG/DL — SIGNIFICANT CHANGE UP (ref 8.4–10.5)
CALCIUM SERPL-MCNC: 10.3 MG/DL — SIGNIFICANT CHANGE UP (ref 8.4–10.5)
CHLORIDE SERPL-SCNC: 104 MMOL/L — SIGNIFICANT CHANGE UP (ref 96–108)
CHLORIDE SERPL-SCNC: 104 MMOL/L — SIGNIFICANT CHANGE UP (ref 96–108)
CO2 SERPL-SCNC: 24 MMOL/L — SIGNIFICANT CHANGE UP (ref 22–31)
CO2 SERPL-SCNC: 24 MMOL/L — SIGNIFICANT CHANGE UP (ref 22–31)
CREAT SERPL-MCNC: 0.85 MG/DL — SIGNIFICANT CHANGE UP (ref 0.5–1.3)
CREAT SERPL-MCNC: 0.85 MG/DL — SIGNIFICANT CHANGE UP (ref 0.5–1.3)
EGFR: 71 ML/MIN/1.73M2 — SIGNIFICANT CHANGE UP
EGFR: 71 ML/MIN/1.73M2 — SIGNIFICANT CHANGE UP
GLUCOSE BLDC GLUCOMTR-MCNC: 118 MG/DL — HIGH (ref 70–99)
GLUCOSE BLDC GLUCOMTR-MCNC: 118 MG/DL — HIGH (ref 70–99)
GLUCOSE BLDC GLUCOMTR-MCNC: 130 MG/DL — HIGH (ref 70–99)
GLUCOSE BLDC GLUCOMTR-MCNC: 130 MG/DL — HIGH (ref 70–99)
GLUCOSE BLDC GLUCOMTR-MCNC: 138 MG/DL — HIGH (ref 70–99)
GLUCOSE BLDC GLUCOMTR-MCNC: 138 MG/DL — HIGH (ref 70–99)
GLUCOSE BLDC GLUCOMTR-MCNC: 142 MG/DL — HIGH (ref 70–99)
GLUCOSE BLDC GLUCOMTR-MCNC: 142 MG/DL — HIGH (ref 70–99)
GLUCOSE SERPL-MCNC: 149 MG/DL — HIGH (ref 70–99)
GLUCOSE SERPL-MCNC: 149 MG/DL — HIGH (ref 70–99)
HCT VFR BLD CALC: 32 % — LOW (ref 34.5–45)
HCT VFR BLD CALC: 32 % — LOW (ref 34.5–45)
HGB BLD-MCNC: 10.1 G/DL — LOW (ref 11.5–15.5)
HGB BLD-MCNC: 10.1 G/DL — LOW (ref 11.5–15.5)
MCHC RBC-ENTMCNC: 27.8 PG — SIGNIFICANT CHANGE UP (ref 27–34)
MCHC RBC-ENTMCNC: 27.8 PG — SIGNIFICANT CHANGE UP (ref 27–34)
MCHC RBC-ENTMCNC: 31.6 GM/DL — LOW (ref 32–36)
MCHC RBC-ENTMCNC: 31.6 GM/DL — LOW (ref 32–36)
MCV RBC AUTO: 88.2 FL — SIGNIFICANT CHANGE UP (ref 80–100)
MCV RBC AUTO: 88.2 FL — SIGNIFICANT CHANGE UP (ref 80–100)
NRBC # BLD: 0 /100 WBCS — SIGNIFICANT CHANGE UP (ref 0–0)
NRBC # BLD: 0 /100 WBCS — SIGNIFICANT CHANGE UP (ref 0–0)
PLATELET # BLD AUTO: 217 K/UL — SIGNIFICANT CHANGE UP (ref 150–400)
PLATELET # BLD AUTO: 217 K/UL — SIGNIFICANT CHANGE UP (ref 150–400)
POTASSIUM SERPL-MCNC: 4.7 MMOL/L — SIGNIFICANT CHANGE UP (ref 3.5–5.3)
POTASSIUM SERPL-MCNC: 4.7 MMOL/L — SIGNIFICANT CHANGE UP (ref 3.5–5.3)
POTASSIUM SERPL-SCNC: 4.7 MMOL/L — SIGNIFICANT CHANGE UP (ref 3.5–5.3)
POTASSIUM SERPL-SCNC: 4.7 MMOL/L — SIGNIFICANT CHANGE UP (ref 3.5–5.3)
RBC # BLD: 3.63 M/UL — LOW (ref 3.8–5.2)
RBC # BLD: 3.63 M/UL — LOW (ref 3.8–5.2)
RBC # FLD: 13.4 % — SIGNIFICANT CHANGE UP (ref 10.3–14.5)
RBC # FLD: 13.4 % — SIGNIFICANT CHANGE UP (ref 10.3–14.5)
SODIUM SERPL-SCNC: 136 MMOL/L — SIGNIFICANT CHANGE UP (ref 135–145)
SODIUM SERPL-SCNC: 136 MMOL/L — SIGNIFICANT CHANGE UP (ref 135–145)
WBC # BLD: 12.36 K/UL — HIGH (ref 3.8–10.5)
WBC # BLD: 12.36 K/UL — HIGH (ref 3.8–10.5)
WBC # FLD AUTO: 12.36 K/UL — HIGH (ref 3.8–10.5)
WBC # FLD AUTO: 12.36 K/UL — HIGH (ref 3.8–10.5)

## 2023-10-31 RX ORDER — TRAMADOL HYDROCHLORIDE 50 MG/1
1 TABLET ORAL
Qty: 28 | Refills: 0
Start: 2023-10-31 | End: 2023-11-06

## 2023-10-31 RX ORDER — ASPIRIN/CALCIUM CARB/MAGNESIUM 324 MG
1 TABLET ORAL
Qty: 0 | Refills: 0 | DISCHARGE
Start: 2023-10-31

## 2023-10-31 RX ORDER — OXYCODONE HYDROCHLORIDE 5 MG/1
1 TABLET ORAL
Qty: 0 | Refills: 0 | DISCHARGE
Start: 2023-10-31

## 2023-10-31 RX ORDER — OXYCODONE HYDROCHLORIDE 5 MG/1
1 TABLET ORAL
Qty: 42 | Refills: 0
Start: 2023-10-31 | End: 2023-11-06

## 2023-10-31 RX ORDER — TRAMADOL HYDROCHLORIDE 50 MG/1
1 TABLET ORAL
Qty: 0 | Refills: 0 | DISCHARGE
Start: 2023-10-31

## 2023-10-31 RX ORDER — SENNA PLUS 8.6 MG/1
2 TABLET ORAL
Qty: 14 | Refills: 0
Start: 2023-10-31 | End: 2023-11-06

## 2023-10-31 RX ORDER — POLYETHYLENE GLYCOL 3350 17 G/17G
17 POWDER, FOR SOLUTION ORAL
Qty: 0 | Refills: 0 | DISCHARGE
Start: 2023-10-31

## 2023-10-31 RX ORDER — NALOXONE HYDROCHLORIDE 4 MG/.1ML
1 SPRAY NASAL
Qty: 0 | Refills: 0 | DISCHARGE

## 2023-10-31 RX ORDER — SENNA PLUS 8.6 MG/1
2 TABLET ORAL
Qty: 0 | Refills: 0 | DISCHARGE
Start: 2023-10-31

## 2023-10-31 RX ORDER — PANTOPRAZOLE SODIUM 20 MG/1
1 TABLET, DELAYED RELEASE ORAL
Qty: 30 | Refills: 0
Start: 2023-10-31 | End: 2023-11-29

## 2023-10-31 RX ORDER — NALOXONE HYDROCHLORIDE 4 MG/.1ML
4 SPRAY NASAL
Qty: 1 | Refills: 0
Start: 2023-10-31

## 2023-10-31 RX ORDER — ASPIRIN/CALCIUM CARB/MAGNESIUM 324 MG
1 TABLET ORAL
Qty: 84 | Refills: 0
Start: 2023-10-31 | End: 2023-12-11

## 2023-10-31 RX ORDER — PANTOPRAZOLE SODIUM 20 MG/1
1 TABLET, DELAYED RELEASE ORAL
Qty: 0 | Refills: 0 | DISCHARGE
Start: 2023-10-31

## 2023-10-31 RX ORDER — ACETAMINOPHEN 500 MG
2 TABLET ORAL
Qty: 84 | Refills: 0
Start: 2023-10-31 | End: 2023-11-13

## 2023-10-31 RX ORDER — ACETAMINOPHEN 500 MG
2 TABLET ORAL
Qty: 0 | Refills: 0 | DISCHARGE
Start: 2023-10-31

## 2023-10-31 RX ADMIN — METFORMIN HYDROCHLORIDE 500 MILLIGRAM(S): 850 TABLET ORAL at 09:32

## 2023-10-31 RX ADMIN — CILOSTAZOL 50 MILLIGRAM(S): 100 TABLET ORAL at 05:08

## 2023-10-31 RX ADMIN — TRAMADOL HYDROCHLORIDE 50 MILLIGRAM(S): 50 TABLET ORAL at 14:20

## 2023-10-31 RX ADMIN — GABAPENTIN 300 MILLIGRAM(S): 400 CAPSULE ORAL at 13:52

## 2023-10-31 RX ADMIN — Medication 100 MILLIGRAM(S): at 04:38

## 2023-10-31 RX ADMIN — Medication 1000 MILLIGRAM(S): at 20:50

## 2023-10-31 RX ADMIN — GABAPENTIN 300 MILLIGRAM(S): 400 CAPSULE ORAL at 21:40

## 2023-10-31 RX ADMIN — ISOSORBIDE MONONITRATE 30 MILLIGRAM(S): 60 TABLET, EXTENDED RELEASE ORAL at 12:50

## 2023-10-31 RX ADMIN — GABAPENTIN 300 MILLIGRAM(S): 400 CAPSULE ORAL at 05:07

## 2023-10-31 RX ADMIN — BUDESONIDE AND FORMOTEROL FUMARATE DIHYDRATE 2 PUFF(S): 160; 4.5 AEROSOL RESPIRATORY (INHALATION) at 05:12

## 2023-10-31 RX ADMIN — TRAMADOL HYDROCHLORIDE 50 MILLIGRAM(S): 50 TABLET ORAL at 05:40

## 2023-10-31 RX ADMIN — BUDESONIDE AND FORMOTEROL FUMARATE DIHYDRATE 2 PUFF(S): 160; 4.5 AEROSOL RESPIRATORY (INHALATION) at 17:10

## 2023-10-31 RX ADMIN — Medication 325 MILLIGRAM(S): at 17:16

## 2023-10-31 RX ADMIN — OXYCODONE HYDROCHLORIDE 2.5 MILLIGRAM(S): 5 TABLET ORAL at 18:16

## 2023-10-31 RX ADMIN — AMLODIPINE BESYLATE 5 MILLIGRAM(S): 2.5 TABLET ORAL at 05:07

## 2023-10-31 RX ADMIN — Medication 8 MILLIGRAM(S): at 05:08

## 2023-10-31 RX ADMIN — Medication 1000 MILLIGRAM(S): at 05:07

## 2023-10-31 RX ADMIN — OXYCODONE HYDROCHLORIDE 2.5 MILLIGRAM(S): 5 TABLET ORAL at 21:41

## 2023-10-31 RX ADMIN — OXYCODONE HYDROCHLORIDE 2.5 MILLIGRAM(S): 5 TABLET ORAL at 22:15

## 2023-10-31 RX ADMIN — Medication 400 MILLIGRAM(S): at 04:37

## 2023-10-31 RX ADMIN — BUMETANIDE 1 MILLIGRAM(S): 0.25 INJECTION INTRAMUSCULAR; INTRAVENOUS at 05:12

## 2023-10-31 RX ADMIN — ALBUTEROL 2 PUFF(S): 90 AEROSOL, METERED ORAL at 12:54

## 2023-10-31 RX ADMIN — OXYCODONE HYDROCHLORIDE 2.5 MILLIGRAM(S): 5 TABLET ORAL at 17:16

## 2023-10-31 RX ADMIN — SENNA PLUS 2 TABLET(S): 8.6 TABLET ORAL at 21:41

## 2023-10-31 RX ADMIN — Medication 1000 MILLIGRAM(S): at 21:20

## 2023-10-31 RX ADMIN — Medication 325 MILLIGRAM(S): at 05:07

## 2023-10-31 RX ADMIN — ATORVASTATIN CALCIUM 20 MILLIGRAM(S): 80 TABLET, FILM COATED ORAL at 09:32

## 2023-10-31 RX ADMIN — Medication 1000 MILLIGRAM(S): at 12:50

## 2023-10-31 RX ADMIN — OXYCODONE HYDROCHLORIDE 5 MILLIGRAM(S): 5 TABLET ORAL at 10:38

## 2023-10-31 RX ADMIN — BISOPROLOL FUMARATE 5 MILLIGRAM(S): 10 TABLET, FILM COATED ORAL at 05:08

## 2023-10-31 RX ADMIN — POLYETHYLENE GLYCOL 3350 17 GRAM(S): 17 POWDER, FOR SOLUTION ORAL at 21:41

## 2023-10-31 RX ADMIN — PANTOPRAZOLE SODIUM 40 MILLIGRAM(S): 20 TABLET, DELAYED RELEASE ORAL at 05:19

## 2023-10-31 RX ADMIN — TRAMADOL HYDROCHLORIDE 50 MILLIGRAM(S): 50 TABLET ORAL at 05:07

## 2023-10-31 RX ADMIN — CILOSTAZOL 50 MILLIGRAM(S): 100 TABLET ORAL at 17:12

## 2023-10-31 RX ADMIN — OXYCODONE HYDROCHLORIDE 5 MILLIGRAM(S): 5 TABLET ORAL at 09:38

## 2023-10-31 RX ADMIN — Medication 1000 MILLIGRAM(S): at 13:50

## 2023-10-31 RX ADMIN — TRAMADOL HYDROCHLORIDE 50 MILLIGRAM(S): 50 TABLET ORAL at 15:20

## 2023-10-31 NOTE — OCCUPATIONAL THERAPY INITIAL EVALUATION ADULT - MARITAL STATUS
[facility] - Ophthalmology  Ophthalmology  [address], [address]  [place] [address]  Phone: [phone]  Fax:   Scheduled Appointment: 4/15/2021 1:00 PM    
Single

## 2023-10-31 NOTE — OCCUPATIONAL THERAPY INITIAL EVALUATION ADULT - PERTINENT HX OF CURRENT PROBLEM, REHAB EVAL
This is a 77 y/o female PMH, HLD, DM2, hypertension, asthma, RAMOS on CPAP, PVD, plan for vascular procedure 10/20/23, osteoarthritis, c/o right knee pain for many years, s/p R TKA in 2021.  Plan for left total knee replacement on 10/30/23 with Dr. Dutton. Pt S/p L Total Knee Arthroplasty.     Goal: I want to be able to walk better than I am"  XRAY LEFT KNEE : Unconstrained left total knee prosthesis implanted.Intact and aligned hardware and no periprosthetic fractures.  Postoperative soft tissue changes.Surgical skin staples overlie operative site.Correlate with intraoperative findings.

## 2023-10-31 NOTE — OCCUPATIONAL THERAPY INITIAL EVALUATION ADULT - LIVES WITH, PROFILE
Pt reports living in a PH , no JESUS + tub shower with tub transfer bench+ used a SC at baseline for mobility outdoors and RW in home. Reports being independent PTA/alone

## 2023-10-31 NOTE — OCCUPATIONAL THERAPY INITIAL EVALUATION ADULT - STRENGTHENING, PT EVAL
GOAL: Pt will increase LLE strength 1 grade for increased safety and independence with ADL task in 4 weeks.

## 2023-11-01 LAB
GLUCOSE BLDC GLUCOMTR-MCNC: 127 MG/DL — HIGH (ref 70–99)
GLUCOSE BLDC GLUCOMTR-MCNC: 127 MG/DL — HIGH (ref 70–99)
GLUCOSE BLDC GLUCOMTR-MCNC: 130 MG/DL — HIGH (ref 70–99)
GLUCOSE BLDC GLUCOMTR-MCNC: 138 MG/DL — HIGH (ref 70–99)
GLUCOSE BLDC GLUCOMTR-MCNC: 138 MG/DL — HIGH (ref 70–99)

## 2023-11-01 RX ADMIN — Medication 325 MILLIGRAM(S): at 05:23

## 2023-11-01 RX ADMIN — BUDESONIDE AND FORMOTEROL FUMARATE DIHYDRATE 2 PUFF(S): 160; 4.5 AEROSOL RESPIRATORY (INHALATION) at 17:38

## 2023-11-01 RX ADMIN — ATORVASTATIN CALCIUM 20 MILLIGRAM(S): 80 TABLET, FILM COATED ORAL at 12:15

## 2023-11-01 RX ADMIN — PANTOPRAZOLE SODIUM 40 MILLIGRAM(S): 20 TABLET, DELAYED RELEASE ORAL at 05:28

## 2023-11-01 RX ADMIN — OXYCODONE HYDROCHLORIDE 2.5 MILLIGRAM(S): 5 TABLET ORAL at 20:50

## 2023-11-01 RX ADMIN — BUMETANIDE 1 MILLIGRAM(S): 0.25 INJECTION INTRAMUSCULAR; INTRAVENOUS at 05:24

## 2023-11-01 RX ADMIN — BUDESONIDE AND FORMOTEROL FUMARATE DIHYDRATE 2 PUFF(S): 160; 4.5 AEROSOL RESPIRATORY (INHALATION) at 05:24

## 2023-11-01 RX ADMIN — BISOPROLOL FUMARATE 5 MILLIGRAM(S): 10 TABLET, FILM COATED ORAL at 05:24

## 2023-11-01 RX ADMIN — CILOSTAZOL 50 MILLIGRAM(S): 100 TABLET ORAL at 05:23

## 2023-11-01 RX ADMIN — Medication 8 MILLIGRAM(S): at 05:23

## 2023-11-01 RX ADMIN — ISOSORBIDE MONONITRATE 30 MILLIGRAM(S): 60 TABLET, EXTENDED RELEASE ORAL at 12:16

## 2023-11-01 RX ADMIN — GABAPENTIN 300 MILLIGRAM(S): 400 CAPSULE ORAL at 05:23

## 2023-11-01 RX ADMIN — Medication 1000 MILLIGRAM(S): at 12:14

## 2023-11-01 RX ADMIN — Medication 325 MILLIGRAM(S): at 17:35

## 2023-11-01 RX ADMIN — Medication 1000 MILLIGRAM(S): at 22:20

## 2023-11-01 RX ADMIN — METFORMIN HYDROCHLORIDE 500 MILLIGRAM(S): 850 TABLET ORAL at 12:15

## 2023-11-01 RX ADMIN — CILOSTAZOL 50 MILLIGRAM(S): 100 TABLET ORAL at 17:36

## 2023-11-01 RX ADMIN — AMLODIPINE BESYLATE 5 MILLIGRAM(S): 2.5 TABLET ORAL at 05:23

## 2023-11-01 RX ADMIN — OXYCODONE HYDROCHLORIDE 2.5 MILLIGRAM(S): 5 TABLET ORAL at 20:19

## 2023-11-01 RX ADMIN — Medication 1000 MILLIGRAM(S): at 05:53

## 2023-11-01 RX ADMIN — POLYETHYLENE GLYCOL 3350 17 GRAM(S): 17 POWDER, FOR SOLUTION ORAL at 21:50

## 2023-11-01 RX ADMIN — OXYCODONE HYDROCHLORIDE 2.5 MILLIGRAM(S): 5 TABLET ORAL at 12:15

## 2023-11-01 RX ADMIN — SENNA PLUS 2 TABLET(S): 8.6 TABLET ORAL at 21:50

## 2023-11-01 RX ADMIN — OXYCODONE HYDROCHLORIDE 2.5 MILLIGRAM(S): 5 TABLET ORAL at 06:19

## 2023-11-01 RX ADMIN — OXYCODONE HYDROCHLORIDE 2.5 MILLIGRAM(S): 5 TABLET ORAL at 06:50

## 2023-11-01 RX ADMIN — Medication 1000 MILLIGRAM(S): at 21:50

## 2023-11-01 RX ADMIN — Medication 1000 MILLIGRAM(S): at 13:15

## 2023-11-01 RX ADMIN — Medication 1000 MILLIGRAM(S): at 05:23

## 2023-11-01 RX ADMIN — GABAPENTIN 300 MILLIGRAM(S): 400 CAPSULE ORAL at 17:38

## 2023-11-01 RX ADMIN — OXYCODONE HYDROCHLORIDE 2.5 MILLIGRAM(S): 5 TABLET ORAL at 13:15

## 2023-11-02 ENCOUNTER — TRANSCRIPTION ENCOUNTER (OUTPATIENT)
Age: 78
End: 2023-11-02

## 2023-11-02 VITALS — OXYGEN SATURATION: 97 % | HEART RATE: 81 BPM

## 2023-11-02 LAB
GLUCOSE BLDC GLUCOMTR-MCNC: 114 MG/DL — HIGH (ref 70–99)
GLUCOSE BLDC GLUCOMTR-MCNC: 114 MG/DL — HIGH (ref 70–99)
GLUCOSE BLDC GLUCOMTR-MCNC: 125 MG/DL — HIGH (ref 70–99)
GLUCOSE BLDC GLUCOMTR-MCNC: 125 MG/DL — HIGH (ref 70–99)

## 2023-11-02 RX ADMIN — AMLODIPINE BESYLATE 5 MILLIGRAM(S): 2.5 TABLET ORAL at 05:20

## 2023-11-02 RX ADMIN — Medication 8 MILLIGRAM(S): at 05:20

## 2023-11-02 RX ADMIN — BISOPROLOL FUMARATE 5 MILLIGRAM(S): 10 TABLET, FILM COATED ORAL at 05:20

## 2023-11-02 RX ADMIN — ISOSORBIDE MONONITRATE 30 MILLIGRAM(S): 60 TABLET, EXTENDED RELEASE ORAL at 11:30

## 2023-11-02 RX ADMIN — PANTOPRAZOLE SODIUM 40 MILLIGRAM(S): 20 TABLET, DELAYED RELEASE ORAL at 05:20

## 2023-11-02 RX ADMIN — BUMETANIDE 1 MILLIGRAM(S): 0.25 INJECTION INTRAMUSCULAR; INTRAVENOUS at 05:20

## 2023-11-02 RX ADMIN — Medication 325 MILLIGRAM(S): at 05:20

## 2023-11-02 RX ADMIN — OXYCODONE HYDROCHLORIDE 2.5 MILLIGRAM(S): 5 TABLET ORAL at 09:23

## 2023-11-02 RX ADMIN — Medication 1000 MILLIGRAM(S): at 05:50

## 2023-11-02 RX ADMIN — OXYCODONE HYDROCHLORIDE 5 MILLIGRAM(S): 5 TABLET ORAL at 13:38

## 2023-11-02 RX ADMIN — Medication 1000 MILLIGRAM(S): at 05:20

## 2023-11-02 RX ADMIN — Medication 1000 MILLIGRAM(S): at 11:30

## 2023-11-02 RX ADMIN — TRAMADOL HYDROCHLORIDE 50 MILLIGRAM(S): 50 TABLET ORAL at 11:35

## 2023-11-02 RX ADMIN — METFORMIN HYDROCHLORIDE 500 MILLIGRAM(S): 850 TABLET ORAL at 11:31

## 2023-11-02 RX ADMIN — ATORVASTATIN CALCIUM 20 MILLIGRAM(S): 80 TABLET, FILM COATED ORAL at 11:30

## 2023-11-02 RX ADMIN — CILOSTAZOL 50 MILLIGRAM(S): 100 TABLET ORAL at 05:23

## 2023-11-02 RX ADMIN — BUDESONIDE AND FORMOTEROL FUMARATE DIHYDRATE 2 PUFF(S): 160; 4.5 AEROSOL RESPIRATORY (INHALATION) at 05:21

## 2023-11-02 RX ADMIN — GABAPENTIN 300 MILLIGRAM(S): 400 CAPSULE ORAL at 08:23

## 2023-11-02 RX ADMIN — OXYCODONE HYDROCHLORIDE 2.5 MILLIGRAM(S): 5 TABLET ORAL at 08:23

## 2023-11-02 NOTE — DISCHARGE NOTE NURSING/CASE MANAGEMENT/SOCIAL WORK - NSDCPEFALRISK_GEN_ALL_CORE
For information on Fall & Injury Prevention, visit: [email or web address] OR  [email or web address] OR  https://www.cdc.gov/steadi/patient.html

## 2023-11-02 NOTE — DISCHARGE NOTE NURSING/CASE MANAGEMENT/SOCIAL WORK - PATIENT PORTAL LINK FT
You can access the FollowMyHealth Patient Portal offered by [facility] by registering at the following website: [email or web address]. By joining [employer]’s FollowMyHealth portal, you will also be able to view your health information using other applications (apps) compatible with our system.

## 2023-11-02 NOTE — PROGRESS NOTE ADULT - ASSESSMENT
A/P: 79 y/o F POD#2 s/p L TKA    DVT ppx- ASA 325mg PO BID  LLE WBAT  PT  OT   Pain management prn  Gi ppx  Discharge planning to home pending PT clearance        JAYNE Delgado  Orthopedic Surgery  [id number]  
   A/P:  Pt is a 78 yr old female s/p left total Knee Arthroplasty, POD #3    - Pain control/ Analgesia  - DVT ppx  BID x 6 weeks, continued on home Pletal, SCDs  - PT/OT - wbat/oob    - Incentive Spirometer  - GI prophylaxis: Protonix  - notify Ortho for questions   - Dispo: to home 11/2/23 with home PT.     Deanne Kasper PA-C  Orthopedic Surgery  Team Pager #[phone]

## 2023-11-02 NOTE — PROGRESS NOTE ADULT - SUBJECTIVE AND OBJECTIVE BOX
ORTHO PROGRESS NOTE     Patient is status post left total knee replacement, POD #3  Pt seen and examined at bedside, denies SOB, CP, Dizziness. N/V/D/HA.    No significant overnight events. Pain well controlled. Patient ambulated with PT, cleared for dispo to home with home PT.     Vital Signs Last 24 Hrs  T(C): 37.1 (02 Nov 2023 04:25), Max: 37.6 (01 Nov 2023 23:34)  T(F): 98.7 (02 Nov 2023 04:25), Max: 99.7 (01 Nov 2023 23:34)  HR: 89 (02 Nov 2023 05:36) (60 - 91)  BP: 112/69 (02 Nov 2023 04:25) (105/62 - 124/67)  BP(mean): --  RR: 18 (02 Nov 2023 04:25) (18 - 18)  SpO2: 95% (02 Nov 2023 05:36) (94% - 98%)    Parameters below as of 02 Nov 2023 04:25  Patient On (Oxygen Delivery Method): room air        Exam:   Gen: No apparent distress  LLE:  Aquacel clean dry and intact to left knee  BLE: motor intact EHL/FHL/TA/GS .  Sensation is grossly intact to light touch in the distal extremities.    Compartments are soft bilaterally.  Extremities are warm .  DP 2+ BLE           
  Patient comfortable  No complaints    T(C): 36.4 (11-01-23 @ 04:47), Max: 36.9 (10-31-23 @ 20:32)  HR: 75 (11-01-23 @ 04:47) (58 - 85)  BP: 119/72 (11-01-23 @ 04:47) (104/64 - 128/77)  RR: 18 (11-01-23 @ 04:47) (18 - 18)  SpO2: 94% (11-01-23 @ 04:47) (94% - 99%)      PHYSICAL EXAM:  NAD, Alert  Left Knee: Aquacel Dressing C/D/I; dull sensation grossly intact to light touch; (+) DF/PF/EHL/FHL; (+) Distal Pulses; No Calf tenderness B/L, PAS     LABS:                        10.1   12.36 )-----------( 217      ( 31 Oct 2023 06:36 )             32.0     10-31    136  |  104  |  16  ----------------------------<  149<H>  4.7   |  24  |  0.85    Ca    10.3      31 Oct 2023 06:35                    
ORTHO PROGRESS NOTE     Patient is status post left total knee replacement, POD # 1  Pt seen and examined at bedside, denies SOB, CP, Dizziness. N/V/D/HA.    No significant overnight events. Pain well controlled.     Vitals stable.         Exam:   Gen: No apparent distress  LLE:  Aquacel clean dry and intact to knee  BLE: motor intact EHL/FHL/TA/GS .  Sensation is grossly intact to light touch in the distal extremities.    Compartments are soft bilaterally.  Extremities are warm .  DP 2+ BLE     Labs:  CBC Full  -  ( 31 Oct 2023 06:36 )  WBC Count : 12.36 K/uL  RBC Count : 3.63 M/uL  Hemoglobin : 10.1 g/dL  Hematocrit : 32.0 %  Platelet Count - Automated : 217 K/uL  Mean Cell Volume : 88.2 fl  Mean Cell Hemoglobin : 27.8 pg  Mean Cell Hemoglobin Concentration : 31.6 gm/dL        10-31    136  |  104  |  16  ----------------------------<  149<H>  4.7   |  24  |  0.85    Ca    10.3      31 Oct 2023 06:35         A/P:  Pt is a  78 yr old female s/p left total knee replacement, POD #1  - Pain control/ Analgesia  - DVT ppx  BID x 6 weeks, pletal continued, SCDs  - PT/OT - wbat/oob    - Incentive Spirometer  - GI prophylaxis: Protonix  - notify Ortho for questions   - Dispo: home with home PT pending PT clr    Deanne Kasper PA-C  Orthopedic Surgery  Team Pager #[phone]

## 2023-11-06 NOTE — PACU DISCHARGE NOTE - MENTAL STATUS: PATIENT PARTICIPATION
"Patient: Amor Corbin    Procedure Summary       Date: 11/06/23 Room / Location: [facility] ENDOSCOPY 4 / [facility] ENDOSCOPY    Anesthesia Start: 0820 Anesthesia Stop: 0848    Procedure: COLONOSCOPY WITH ANESTHESIA Diagnosis:       Adenomatous polyp of colon, unspecified part of colon      (Adenomatous polyp of colon, unspecified part of colon [D12.6])    Surgeons: Brian Bray DO Provider: Vishal Franco CRNA    Anesthesia Type: MAC ASA Status: 3            Anesthesia Type: MAC    Vitals  No vitals data found for the desired time range.          Post Anesthesia Care and Evaluation    Patient location during evaluation: PHASE II  Patient participation: complete - patient participated  Level of consciousness: awake  Pain score: 0  Pain management: adequate    Airway patency: patent  Anesthetic complications: No anesthetic complications  PONV Status: none  Cardiovascular status: acceptable  Respiratory status: acceptable  Hydration status: acceptable    Comments: Blood pressure 165/81, pulse 55, temperature 96.8 °F (36 °C), temperature source Temporal, resp. rate 20, height 172.7 cm (68\"), weight 114 kg (252 lb), SpO2 95%.      " Awake

## 2023-11-13 PROCEDURE — 85027 COMPLETE CBC AUTOMATED: CPT

## 2023-11-13 PROCEDURE — 97165 OT EVAL LOW COMPLEX 30 MIN: CPT

## 2023-11-13 PROCEDURE — 80048 BASIC METABOLIC PNL TOTAL CA: CPT

## 2023-11-13 PROCEDURE — 97116 GAIT TRAINING THERAPY: CPT

## 2023-11-13 PROCEDURE — 97110 THERAPEUTIC EXERCISES: CPT

## 2023-11-13 PROCEDURE — C1776: CPT

## 2023-11-13 PROCEDURE — 73560 X-RAY EXAM OF KNEE 1 OR 2: CPT

## 2023-11-13 PROCEDURE — 82962 GLUCOSE BLOOD TEST: CPT

## 2023-11-13 PROCEDURE — 94640 AIRWAY INHALATION TREATMENT: CPT

## 2023-11-13 PROCEDURE — 36415 COLL VENOUS BLD VENIPUNCTURE: CPT

## 2023-11-13 PROCEDURE — C1713: CPT

## 2023-11-13 PROCEDURE — 97161 PT EVAL LOW COMPLEX 20 MIN: CPT

## 2023-11-15 ENCOUNTER — APPOINTMENT (OUTPATIENT)
Dept: ORTHOPEDIC SURGERY | Facility: CLINIC | Age: 78
End: 2023-11-15
Payer: MEDICARE

## 2023-11-15 VITALS
BODY MASS INDEX: 40.97 KG/M2 | DIASTOLIC BLOOD PRESSURE: 57 MMHG | WEIGHT: 240 LBS | HEIGHT: 64 IN | HEART RATE: 75 BPM | SYSTOLIC BLOOD PRESSURE: 104 MMHG

## 2023-11-15 PROBLEM — R01.1 CARDIAC MURMUR, UNSPECIFIED: Chronic | Status: ACTIVE | Noted: 2023-10-16

## 2023-11-15 PROBLEM — G47.33 OBSTRUCTIVE SLEEP APNEA (ADULT) (PEDIATRIC): Chronic | Status: ACTIVE | Noted: 2023-10-16

## 2023-11-15 PROBLEM — E11.9 TYPE 2 DIABETES MELLITUS WITHOUT COMPLICATIONS: Chronic | Status: ACTIVE | Noted: 2023-10-16

## 2023-11-15 PROBLEM — I10 ESSENTIAL (PRIMARY) HYPERTENSION: Chronic | Status: ACTIVE | Noted: 2023-10-16

## 2023-11-15 PROCEDURE — 73562 X-RAY EXAM OF KNEE 3: CPT | Mod: LT

## 2023-11-15 PROCEDURE — 73560 X-RAY EXAM OF KNEE 1 OR 2: CPT | Mod: RT

## 2023-11-15 PROCEDURE — 99024 POSTOP FOLLOW-UP VISIT: CPT

## 2023-11-15 RX ORDER — TRAMADOL HYDROCHLORIDE 50 MG/1
50 TABLET, COATED ORAL
Qty: 20 | Refills: 0 | Status: ACTIVE | COMMUNITY
Start: 2023-11-15 | End: 1900-01-01

## 2023-11-27 ENCOUNTER — NON-APPOINTMENT (OUTPATIENT)
Age: 78
End: 2023-11-27

## 2023-12-06 ENCOUNTER — NON-APPOINTMENT (OUTPATIENT)
Age: 78
End: 2023-12-06

## 2023-12-06 RX ORDER — TRAMADOL HYDROCHLORIDE 50 MG/1
50 TABLET, COATED ORAL
Qty: 21 | Refills: 0 | Status: ACTIVE | COMMUNITY
Start: 2023-12-06 | End: 1900-01-01

## 2023-12-06 RX ORDER — ACETAMINOPHEN 500 MG/1
500 TABLET, COATED ORAL
Qty: 180 | Refills: 0 | Status: ACTIVE | COMMUNITY
Start: 2023-12-06 | End: 1900-01-01

## 2023-12-10 ENCOUNTER — NON-APPOINTMENT (OUTPATIENT)
Age: 78
End: 2023-12-10

## 2023-12-11 ENCOUNTER — NON-APPOINTMENT (OUTPATIENT)
Age: 78
End: 2023-12-11

## 2023-12-22 ENCOUNTER — NON-APPOINTMENT (OUTPATIENT)
Age: 78
End: 2023-12-22

## 2023-12-28 RX ORDER — TRAMADOL HYDROCHLORIDE 50 MG/1
50 TABLET, COATED ORAL EVERY 8 HOURS
Qty: 21 | Refills: 0 | Status: ACTIVE | COMMUNITY
Start: 2023-12-28 | End: 1900-01-01

## 2024-01-03 ENCOUNTER — APPOINTMENT (OUTPATIENT)
Dept: ORTHOPEDIC SURGERY | Facility: CLINIC | Age: 79
End: 2024-01-03
Payer: MEDICARE

## 2024-01-03 PROCEDURE — 99024 POSTOP FOLLOW-UP VISIT: CPT

## 2024-01-03 PROCEDURE — 73562 X-RAY EXAM OF KNEE 3: CPT | Mod: LT

## 2024-01-03 PROCEDURE — 73560 X-RAY EXAM OF KNEE 1 OR 2: CPT | Mod: RT

## 2024-01-30 ENCOUNTER — NON-APPOINTMENT (OUTPATIENT)
Age: 79
End: 2024-01-30

## 2024-02-05 ENCOUNTER — NON-APPOINTMENT (OUTPATIENT)
Age: 79
End: 2024-02-05

## 2024-02-05 RX ORDER — TRAMADOL HYDROCHLORIDE 50 MG/1
50 TABLET, COATED ORAL
Qty: 21 | Refills: 0 | Status: ACTIVE | COMMUNITY
Start: 2024-02-05 | End: 1900-01-01

## 2024-03-01 ENCOUNTER — NON-APPOINTMENT (OUTPATIENT)
Age: 79
End: 2024-03-01

## 2024-03-01 LAB
CRP SERPL-MCNC: 4 MG/L
HCT VFR BLD CALC: 35.5 %
HGB BLD-MCNC: 11 G/DL
MCHC RBC-ENTMCNC: 27.2 PG
MCHC RBC-ENTMCNC: 31 GM/DL
MCV RBC AUTO: 87.7 FL
PLATELET # BLD AUTO: 228 K/UL
RBC # BLD: 4.05 M/UL
RBC # FLD: 15.4 %
WBC # FLD AUTO: 5.71 K/UL

## 2024-04-10 ENCOUNTER — APPOINTMENT (OUTPATIENT)
Dept: ORTHOPEDIC SURGERY | Facility: CLINIC | Age: 79
End: 2024-04-10
Payer: MEDICARE

## 2024-04-10 VITALS — WEIGHT: 230 LBS | HEIGHT: 64 IN | BODY MASS INDEX: 39.27 KG/M2

## 2024-04-10 DIAGNOSIS — L81.9 DISORDER OF PIGMENTATION, UNSPECIFIED: ICD-10-CM

## 2024-04-10 DIAGNOSIS — Z96.651 PRESENCE OF RIGHT ARTIFICIAL KNEE JOINT: ICD-10-CM

## 2024-04-10 DIAGNOSIS — Z96.652 PRESENCE OF LEFT ARTIFICIAL KNEE JOINT: ICD-10-CM

## 2024-04-10 PROCEDURE — 99213 OFFICE O/P EST LOW 20 MIN: CPT

## 2024-04-10 PROCEDURE — 73562 X-RAY EXAM OF KNEE 3: CPT | Mod: LT

## 2024-04-19 ENCOUNTER — NON-APPOINTMENT (OUTPATIENT)
Age: 79
End: 2024-04-19

## 2024-05-07 ENCOUNTER — APPOINTMENT (OUTPATIENT)
Dept: VASCULAR SURGERY | Facility: CLINIC | Age: 79
End: 2024-05-07
Payer: MEDICARE

## 2024-05-07 VITALS
SYSTOLIC BLOOD PRESSURE: 148 MMHG | HEART RATE: 65 BPM | HEIGHT: 64 IN | TEMPERATURE: 98.3 F | WEIGHT: 241 LBS | DIASTOLIC BLOOD PRESSURE: 76 MMHG | BODY MASS INDEX: 41.15 KG/M2

## 2024-05-07 DIAGNOSIS — I87.2 VENOUS INSUFFICIENCY (CHRONIC) (PERIPHERAL): ICD-10-CM

## 2024-05-07 DIAGNOSIS — I89.0 LYMPHEDEMA, NOT ELSEWHERE CLASSIFIED: ICD-10-CM

## 2024-05-07 DIAGNOSIS — Z78.9 OTHER SPECIFIED HEALTH STATUS: ICD-10-CM

## 2024-05-07 PROCEDURE — 99203 OFFICE O/P NEW LOW 30 MIN: CPT

## 2024-05-07 PROCEDURE — 93971 EXTREMITY STUDY: CPT | Mod: LT

## 2024-05-08 PROBLEM — Z78.9 NON-SMOKER: Status: ACTIVE | Noted: 2024-05-07

## 2024-05-08 PROBLEM — I89.0 LYMPHEDEMA OF BOTH LOWER EXTREMITIES: Status: ACTIVE | Noted: 2024-05-08

## 2024-05-08 PROBLEM — I87.2 CHRONIC VENOUS INSUFFICIENCY: Status: ACTIVE | Noted: 2024-05-08

## 2024-05-08 RX ORDER — ATORVASTATIN CALCIUM 80 MG/1
TABLET, FILM COATED ORAL
Refills: 0 | Status: ACTIVE | COMMUNITY

## 2024-05-08 NOTE — PHYSICAL EXAM
[Respiratory Effort] : normal respiratory effort [Normal Rate and Rhythm] : normal rate and rhythm [1+] : left 1+ [Ankle Swelling (On Exam)] : present [Ankle Swelling Bilaterally] : severe [Varicose Veins Of Lower Extremities] : not present [] : of the left leg [Ankle Swelling On The Left] : moderate [Abdomen Tenderness] : ~T ~M No abdominal tenderness [Skin Ulcer] : no ulcer [Alert] : alert [Oriented to Person] : oriented to person [Oriented to Place] : oriented to place [Oriented to Time] : oriented to time [Calm] : calm [de-identified] : appears stated age [de-identified] : normocephalic, atraumatic [de-identified] : supple

## 2024-05-08 NOTE — ASSESSMENT
[FreeTextEntry1] : Problem #1 chronic lymphedema of bilateral lower extremities Lymphedema I89.0 secondary to obesity, surgery, and venous insufficiency.  Patient has attempted use of compression, elevation and exercise for a period of over 4 weeks without any significant improvement in symptoms or swelling and swelling extends proximally into the truncal region, noting truncal related lymphedema. Hyperpigmentation noted.    Will refer for lymphedema pump and compression garments.  Problem #2 chronic venous insufficiency recommend leg elevation at rest compression garments daily as tolerated

## 2024-05-08 NOTE — HISTORY OF PRESENT ILLNESS
[FreeTextEntry1] : 78-year-old woman with history of osteoarthritis of both hips and knees status post bilateral total knee replacements presents with left lower extremity swelling. She denies claudication or rest pain.

## 2024-10-23 ENCOUNTER — APPOINTMENT (OUTPATIENT)
Dept: ORTHOPEDIC SURGERY | Facility: CLINIC | Age: 79
End: 2024-10-23
Payer: MEDICARE

## 2024-10-23 DIAGNOSIS — Z96.652 PRESENCE OF LEFT ARTIFICIAL KNEE JOINT: ICD-10-CM

## 2024-10-23 DIAGNOSIS — Z96.651 PRESENCE OF RIGHT ARTIFICIAL KNEE JOINT: ICD-10-CM

## 2024-10-23 PROCEDURE — 99213 OFFICE O/P EST LOW 20 MIN: CPT

## 2024-10-23 PROCEDURE — 73562 X-RAY EXAM OF KNEE 3: CPT | Mod: 50

## 2025-04-23 ENCOUNTER — APPOINTMENT (OUTPATIENT)
Dept: ORTHOPEDIC SURGERY | Facility: CLINIC | Age: 80
End: 2025-04-23
Payer: MEDICARE

## 2025-04-23 DIAGNOSIS — Z96.651 PRESENCE OF RIGHT ARTIFICIAL KNEE JOINT: ICD-10-CM

## 2025-04-23 DIAGNOSIS — T84.033A MECHANICAL LOOSENING OF INTERNAL LEFT KNEE PROSTHETIC JOINT, INITIAL ENCOUNTER: ICD-10-CM

## 2025-04-23 PROCEDURE — 73564 X-RAY EXAM KNEE 4 OR MORE: CPT | Mod: LT

## 2025-04-23 PROCEDURE — 99213 OFFICE O/P EST LOW 20 MIN: CPT | Mod: 57

## 2025-04-23 PROCEDURE — 73560 X-RAY EXAM OF KNEE 1 OR 2: CPT | Mod: RT

## 2025-05-15 ENCOUNTER — APPOINTMENT (OUTPATIENT)
Dept: CT IMAGING | Facility: IMAGING CENTER | Age: 80
End: 2025-05-15
Payer: MEDICARE

## 2025-05-15 ENCOUNTER — OUTPATIENT (OUTPATIENT)
Dept: OUTPATIENT SERVICES | Facility: HOSPITAL | Age: 80
LOS: 1 days | End: 2025-05-15
Payer: MEDICARE

## 2025-05-15 DIAGNOSIS — S68.119A COMPLETE TRAUMATIC METACARPOPHALANGEAL AMPUTATION OF UNSPECIFIED FINGER, INITIAL ENCOUNTER: Chronic | ICD-10-CM

## 2025-05-15 DIAGNOSIS — Z98.890 OTHER SPECIFIED POSTPROCEDURAL STATES: Chronic | ICD-10-CM

## 2025-05-15 DIAGNOSIS — Z00.8 ENCOUNTER FOR OTHER GENERAL EXAMINATION: ICD-10-CM

## 2025-05-15 DIAGNOSIS — Z96.651 PRESENCE OF RIGHT ARTIFICIAL KNEE JOINT: Chronic | ICD-10-CM

## 2025-05-15 DIAGNOSIS — Z96.652 PRESENCE OF LEFT ARTIFICIAL KNEE JOINT: ICD-10-CM

## 2025-05-15 PROCEDURE — 73700 CT LOWER EXTREMITY W/O DYE: CPT | Mod: 26,LT

## 2025-05-15 PROCEDURE — 73700 CT LOWER EXTREMITY W/O DYE: CPT

## 2025-06-17 ENCOUNTER — APPOINTMENT (OUTPATIENT)
Dept: ORTHOPEDIC SURGERY | Facility: CLINIC | Age: 80
End: 2025-06-17

## 2025-06-17 VITALS — HEIGHT: 65 IN | WEIGHT: 237 LBS | BODY MASS INDEX: 39.49 KG/M2

## 2025-06-17 PROBLEM — M17.0 PRIMARY LOCALIZED OSTEOARTHRITIS OF BOTH KNEES: Status: RESOLVED | Noted: 2021-03-31 | Resolved: 2025-06-17

## 2025-06-17 PROBLEM — M17.12 PRIMARY OSTEOARTHRITIS OF LEFT KNEE: Status: RESOLVED | Noted: 2021-06-01 | Resolved: 2025-06-17

## 2025-06-17 PROCEDURE — 99215 OFFICE O/P EST HI 40 MIN: CPT | Mod: 25

## 2025-06-17 PROCEDURE — 20610 DRAIN/INJ JOINT/BURSA W/O US: CPT | Mod: LT

## 2025-06-18 LAB
B PERT IGG+IGM PNL SER: ABNORMAL
COLOR FLD: YELLOW
CRP SERPL-MCNC: 5 MG/L
EOSINOPHIL # FLD MANUAL: 0 %
ERYTHROCYTE [SEDIMENTATION RATE] IN BLOOD BY WESTERGREN METHOD: 65 MM/HR
FLUID INTAKE SUBSTANCE CLASS: NORMAL
LYMPHOCYTES # FLD MANUAL: 50 %
MESOTHL CELL NFR FLD: 0 %
MONOS+MACROS NFR FLD MANUAL: 16 %
NEUTS SEG # FLD MANUAL: 34 %
NRBC # FLD: 0 %
RBC # FLD MANUAL: 3000 CELLS/UL
SYCRY CLARITY: ABNORMAL
SYCRY COLOR: YELLOW
SYCRY ID: NORMAL
SYCRY TUBE: NORMAL
TOTAL CELLS COUNTED FLD: 1720 CELLS/UL
TUBE TYPE: NORMAL
UNIDENT CELLS NFR FLD MANUAL: 0 %
VARIANT LYMPHS # FLD MANUAL: 0 %
WBC COUNT: 1671 CELLS/UL

## 2025-07-14 NOTE — OCCUPATIONAL THERAPY INITIAL EVALUATION ADULT - LEVEL OF INDEPENDENCE: BED TO CHAIR, REHAB EVAL
Please resume plavix and asa today.  Can remove groin dressings tomorrow and shower.  No significant exercise/activity today.    ______________________________________________________________________    Anesthesia Discharge Instructions    After general anesthesia or intervenous sedation, for 24 hours or while taking prescription Narcotics:  Limit your activities  Do not drive or operate hazardous machinery  If you have not urinated within 8 hours after discharge, please contact your surgeon on call.  Do not make important personal or business decisions  Do not drink alcoholic beverages    Report the following to your surgeon:  Excessive pain, swelling, redness or odor of or around the surgical area  Temperature over 100.5 degrees  Nausea and vomiting lasting longer than 4 hours or if unable to take medication  Any signs of decreased circulation or nerve impairment to extremity:  Change in color, persistent numbness, tingling, coldness or increased pain.  Any questions      moderate assist (50% patients effort)/maximum assist (25% patients effort)

## 2025-07-28 ENCOUNTER — NON-APPOINTMENT (OUTPATIENT)
Age: 80
End: 2025-07-28

## 2025-07-29 ENCOUNTER — APPOINTMENT (OUTPATIENT)
Facility: CLINIC | Age: 80
End: 2025-07-29
Payer: MEDICARE

## 2025-07-29 ENCOUNTER — NON-APPOINTMENT (OUTPATIENT)
Age: 80
End: 2025-07-29

## 2025-07-29 PROCEDURE — 92004 COMPRE OPH EXAM NEW PT 1/>: CPT

## 2025-07-29 PROCEDURE — 92025 CPTRIZED CORNEAL TOPOGRAPHY: CPT

## 2025-07-29 PROCEDURE — 92133 CPTRZD OPH DX IMG PST SGM ON: CPT

## 2025-07-29 PROCEDURE — 92136 OPHTHALMIC BIOMETRY: CPT

## 2025-08-26 ENCOUNTER — APPOINTMENT (OUTPATIENT)
Dept: CT IMAGING | Facility: CLINIC | Age: 80
End: 2025-08-26

## 2025-08-26 ENCOUNTER — RESULT REVIEW (OUTPATIENT)
Age: 80
End: 2025-08-26

## 2025-08-26 ENCOUNTER — APPOINTMENT (OUTPATIENT)
Dept: CT IMAGING | Facility: HOSPITAL | Age: 80
End: 2025-08-26

## 2025-08-26 ENCOUNTER — OUTPATIENT (OUTPATIENT)
Dept: OUTPATIENT SERVICES | Facility: HOSPITAL | Age: 80
LOS: 1 days | End: 2025-08-26
Payer: MEDICARE

## 2025-08-26 VITALS
HEIGHT: 62 IN | RESPIRATION RATE: 22 BRPM | HEART RATE: 55 BPM | WEIGHT: 235.01 LBS | SYSTOLIC BLOOD PRESSURE: 134 MMHG | OXYGEN SATURATION: 99 % | TEMPERATURE: 98 F | DIASTOLIC BLOOD PRESSURE: 79 MMHG

## 2025-08-26 DIAGNOSIS — G47.33 OBSTRUCTIVE SLEEP APNEA (ADULT) (PEDIATRIC): ICD-10-CM

## 2025-08-26 DIAGNOSIS — T84.033A MECHANICAL LOOSENING OF INTERNAL LEFT KNEE PROSTHETIC JOINT, INITIAL ENCOUNTER: ICD-10-CM

## 2025-08-26 DIAGNOSIS — Z96.651 PRESENCE OF RIGHT ARTIFICIAL KNEE JOINT: Chronic | ICD-10-CM

## 2025-08-26 DIAGNOSIS — E11.9 TYPE 2 DIABETES MELLITUS WITHOUT COMPLICATIONS: ICD-10-CM

## 2025-08-26 DIAGNOSIS — Z01.818 ENCOUNTER FOR OTHER PREPROCEDURAL EXAMINATION: ICD-10-CM

## 2025-08-26 DIAGNOSIS — S68.119A COMPLETE TRAUMATIC METACARPOPHALANGEAL AMPUTATION OF UNSPECIFIED FINGER, INITIAL ENCOUNTER: Chronic | ICD-10-CM

## 2025-08-26 DIAGNOSIS — Z96.652 PRESENCE OF LEFT ARTIFICIAL KNEE JOINT: Chronic | ICD-10-CM

## 2025-08-26 DIAGNOSIS — Z98.890 OTHER SPECIFIED POSTPROCEDURAL STATES: Chronic | ICD-10-CM

## 2025-08-26 LAB
A1C WITH ESTIMATED AVERAGE GLUCOSE RESULT: 5.9 % — HIGH (ref 4–5.6)
ANION GAP SERPL CALC-SCNC: 12 MMOL/L — SIGNIFICANT CHANGE UP (ref 5–17)
BLD GP AB SCN SERPL QL: NEGATIVE — SIGNIFICANT CHANGE UP
BUN SERPL-MCNC: 12 MG/DL — SIGNIFICANT CHANGE UP (ref 7–23)
CALCIUM SERPL-MCNC: 10.6 MG/DL — HIGH (ref 8.4–10.5)
CHLORIDE SERPL-SCNC: 105 MMOL/L — SIGNIFICANT CHANGE UP (ref 96–108)
CO2 SERPL-SCNC: 21 MMOL/L — LOW (ref 22–31)
CREAT SERPL-MCNC: 0.94 MG/DL — SIGNIFICANT CHANGE UP (ref 0.5–1.3)
EGFR: 62 ML/MIN/1.73M2 — SIGNIFICANT CHANGE UP
EGFR: 62 ML/MIN/1.73M2 — SIGNIFICANT CHANGE UP
ESTIMATED AVERAGE GLUCOSE: 123 MG/DL — HIGH (ref 68–114)
GLUCOSE SERPL-MCNC: 106 MG/DL — HIGH (ref 70–99)
HCT VFR BLD CALC: 39.2 % — SIGNIFICANT CHANGE UP (ref 34.5–45)
HGB BLD-MCNC: 12.2 G/DL — SIGNIFICANT CHANGE UP (ref 11.5–15.5)
MCHC RBC-ENTMCNC: 27.7 PG — SIGNIFICANT CHANGE UP (ref 27–34)
MCHC RBC-ENTMCNC: 31.1 G/DL — LOW (ref 32–36)
MCV RBC AUTO: 89.1 FL — SIGNIFICANT CHANGE UP (ref 80–100)
MRSA PCR RESULT.: SIGNIFICANT CHANGE UP
NRBC # BLD AUTO: 0 K/UL — SIGNIFICANT CHANGE UP (ref 0–0)
NRBC # FLD: 0 K/UL — SIGNIFICANT CHANGE UP (ref 0–0)
NRBC BLD AUTO-RTO: 0 /100 WBCS — SIGNIFICANT CHANGE UP (ref 0–0)
PLATELET # BLD AUTO: 244 K/UL — SIGNIFICANT CHANGE UP (ref 150–400)
PMV BLD: 8.9 FL — SIGNIFICANT CHANGE UP (ref 7–13)
POTASSIUM SERPL-MCNC: 4 MMOL/L — SIGNIFICANT CHANGE UP (ref 3.5–5.3)
POTASSIUM SERPL-SCNC: 4 MMOL/L — SIGNIFICANT CHANGE UP (ref 3.5–5.3)
RBC # BLD: 4.4 M/UL — SIGNIFICANT CHANGE UP (ref 3.8–5.2)
RBC # FLD: 13.8 % — SIGNIFICANT CHANGE UP (ref 10.3–14.5)
RH IG SCN BLD-IMP: NEGATIVE — SIGNIFICANT CHANGE UP
S AUREUS DNA NOSE QL NAA+PROBE: SIGNIFICANT CHANGE UP
SODIUM SERPL-SCNC: 138 MMOL/L — SIGNIFICANT CHANGE UP (ref 135–145)
WBC # BLD: 5.59 K/UL — SIGNIFICANT CHANGE UP (ref 3.8–10.5)
WBC # FLD AUTO: 5.59 K/UL — SIGNIFICANT CHANGE UP (ref 3.8–10.5)

## 2025-08-26 PROCEDURE — 80048 BASIC METABOLIC PNL TOTAL CA: CPT

## 2025-08-26 PROCEDURE — 87641 MR-STAPH DNA AMP PROBE: CPT

## 2025-08-26 PROCEDURE — 86900 BLOOD TYPING SEROLOGIC ABO: CPT

## 2025-08-26 PROCEDURE — 87640 STAPH A DNA AMP PROBE: CPT

## 2025-08-26 PROCEDURE — G0463: CPT

## 2025-08-26 PROCEDURE — 86850 RBC ANTIBODY SCREEN: CPT

## 2025-08-26 PROCEDURE — 83036 HEMOGLOBIN GLYCOSYLATED A1C: CPT

## 2025-08-26 PROCEDURE — 86901 BLOOD TYPING SEROLOGIC RH(D): CPT

## 2025-08-26 PROCEDURE — 73700 CT LOWER EXTREMITY W/O DYE: CPT | Mod: 26,LT

## 2025-08-26 PROCEDURE — 85027 COMPLETE CBC AUTOMATED: CPT

## 2025-08-26 PROCEDURE — 73700 CT LOWER EXTREMITY W/O DYE: CPT

## 2025-08-26 RX ORDER — EMPAGLIFLOZIN 25 MG/1
1 TABLET, FILM COATED ORAL
Refills: 0 | DISCHARGE

## 2025-09-01 ENCOUNTER — NON-APPOINTMENT (OUTPATIENT)
Age: 80
End: 2025-09-01

## 2025-09-02 ENCOUNTER — NON-APPOINTMENT (OUTPATIENT)
Age: 80
End: 2025-09-02

## 2025-09-12 PROBLEM — H26.9 UNSPECIFIED CATARACT: Chronic | Status: ACTIVE | Noted: 2025-08-26

## 2025-09-15 ENCOUNTER — APPOINTMENT (OUTPATIENT)
Dept: ORTHOPEDIC SURGERY | Facility: HOSPITAL | Age: 80
End: 2025-09-15

## 2025-09-15 ENCOUNTER — TRANSCRIPTION ENCOUNTER (OUTPATIENT)
Age: 80
End: 2025-09-15

## 2025-09-15 RX ORDER — EMPAGLIFLOZIN 25 MG/1
1 TABLET, FILM COATED ORAL
Refills: 0 | DISCHARGE

## 2025-09-15 RX ORDER — ISOSORBDIE DINITRATE 30 MG/1
1 TABLET ORAL
Refills: 0 | DISCHARGE

## 2025-09-15 RX ORDER — ALBUTEROL SULFATE 2.5 MG/3ML
2 VIAL, NEBULIZER (ML) INHALATION
Refills: 0 | DISCHARGE

## 2025-09-15 RX ORDER — LISINOPRIL 30 MG/1
1 TABLET ORAL
Refills: 0 | DISCHARGE

## 2025-09-15 RX ORDER — TIRZEPATIDE 7.5 MG/.5ML
7.5 INJECTION, SOLUTION SUBCUTANEOUS
Refills: 0 | DISCHARGE

## 2025-09-16 ENCOUNTER — TRANSCRIPTION ENCOUNTER (OUTPATIENT)
Age: 80
End: 2025-09-16

## 2025-09-16 RX ORDER — ACETAMINOPHEN 500 MG/5ML
2 LIQUID (ML) ORAL
Refills: 0 | DISCHARGE

## 2025-09-17 ENCOUNTER — NON-APPOINTMENT (OUTPATIENT)
Age: 80
End: 2025-09-17

## 2025-09-19 ENCOUNTER — APPOINTMENT (OUTPATIENT)
Dept: ORTHOPEDIC SURGERY | Facility: CLINIC | Age: 80
End: 2025-09-19
Payer: MEDICARE

## 2025-09-19 VITALS — WEIGHT: 232 LBS | HEIGHT: 65 IN | BODY MASS INDEX: 38.65 KG/M2

## 2025-09-19 DIAGNOSIS — I87.2 VENOUS INSUFFICIENCY (CHRONIC) (PERIPHERAL): ICD-10-CM

## 2025-09-19 DIAGNOSIS — Z96.652 PRESENCE OF LEFT ARTIFICIAL KNEE JOINT: ICD-10-CM

## 2025-09-19 DIAGNOSIS — L81.9 DISORDER OF PIGMENTATION, UNSPECIFIED: ICD-10-CM

## 2025-09-19 DIAGNOSIS — T84.033A MECHANICAL LOOSENING OF INTERNAL LEFT KNEE PROSTHETIC JOINT, INITIAL ENCOUNTER: ICD-10-CM

## 2025-09-19 DIAGNOSIS — Z96.651 PRESENCE OF RIGHT ARTIFICIAL KNEE JOINT: ICD-10-CM

## 2025-09-19 DIAGNOSIS — M17.11 UNILATERAL PRIMARY OSTEOARTHRITIS, RIGHT KNEE: ICD-10-CM

## 2025-09-19 PROCEDURE — 99024 POSTOP FOLLOW-UP VISIT: CPT

## (undated) DEVICE — NDL HYPO SAFE 18G X 1.5" (PINK)

## (undated) DEVICE — HOOD FLYTE STRYKER HELMET SHIELD

## (undated) DEVICE — SAW BLADE STRYKER SAGITTAL 25X1.27X90

## (undated) DEVICE — STAPLER SKIN VISI-STAT 35 WIDE

## (undated) DEVICE — DRAPE TOWEL BLUE 17" X 24"

## (undated) DEVICE — DRSG AQUACEL 3.5 X 10"

## (undated) DEVICE — SOL INJ NS 0.9% 500ML 1-PORT

## (undated) DEVICE — LAP PAD 4 X 18"

## (undated) DEVICE — WOUND IRR SURGIPHOR

## (undated) DEVICE — SOL BETADINE POUCH 0.75OZ STERILE

## (undated) DEVICE — VENODYNE/SCD SLEEVE CALF LARGE

## (undated) DEVICE — SYR LUER LOK 20CC

## (undated) DEVICE — SOL INJ NS 0.9% 1000ML

## (undated) DEVICE — GLV 8 PROTEXIS (WHITE)

## (undated) DEVICE — NDL HYPO SAFE 22G X 1.5" (BLACK)

## (undated) DEVICE — DRSG ACE BANDAGE 6"

## (undated) DEVICE — GOWN TRIMAX LG

## (undated) DEVICE — TUBING TUR 2 PRONG

## (undated) DEVICE — GLV 7.5 PROTEXIS (WHITE)

## (undated) DEVICE — ELCTR BOVIE PENCIL SMOKE EVACUATION

## (undated) DEVICE — SOL IRR POUR H2O 1000ML

## (undated) DEVICE — DRAPE MAYO STAND 30"

## (undated) DEVICE — PACK TOTAL KNEE (2 PACKS)

## (undated) DEVICE — GLV 7 PROTEXIS (WHITE)

## (undated) DEVICE — GLV 8.5 PROTEXIS (WHITE)

## (undated) DEVICE — STRYKER MIXEVAC 3 BONE CEMENT MIXER

## (undated) DEVICE — WARMING BLANKET UPPER ADULT

## (undated) DEVICE — SUCTION YANKAUER NO CONTROL VENT

## (undated) DEVICE — SUT POLYSORB 2-0 30" GS-21 UNDYED

## (undated) DEVICE — DRAPE 3/4 SHEET W REINFORCEMENT 56X77"

## (undated) DEVICE — GLV 6.5 PROTEXIS (WHITE)

## (undated) DEVICE — TOURNIQUET CUFF 34" DUAL PORT W PLC

## (undated) DEVICE — POSITIONER FOAM EGG CRATE ULNAR 2PCS (PINK)

## (undated) DEVICE — SUT POLYSORB 0 18" GS-21

## (undated) DEVICE — TAPE SILK 3"

## (undated) DEVICE — SOL IRR POUR NS 0.9% 500ML